# Patient Record
Sex: FEMALE | Race: WHITE | Employment: FULL TIME | URBAN - METROPOLITAN AREA
[De-identification: names, ages, dates, MRNs, and addresses within clinical notes are randomized per-mention and may not be internally consistent; named-entity substitution may affect disease eponyms.]

---

## 2017-03-31 ENCOUNTER — HOSPITAL ENCOUNTER (EMERGENCY)
Age: 27
Discharge: HOME | End: 2017-03-31
Attending: EMERGENCY MEDICINE
Payer: OTHER MISCELLANEOUS

## 2017-03-31 VITALS
WEIGHT: 149.69 LBS | DIASTOLIC BLOOD PRESSURE: 89 MMHG | RESPIRATION RATE: 18 BRPM | HEIGHT: 72 IN | HEART RATE: 95 BPM | OXYGEN SATURATION: 98 % | SYSTOLIC BLOOD PRESSURE: 120 MMHG | BODY MASS INDEX: 20.28 KG/M2 | TEMPERATURE: 97.7 F
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
## 2017-03-31 PROCEDURE — 99282 EMERGENCY DEPT VISIT SF MDM: CPT

## 2017-03-31 RX ORDER — ELETRIPTAN HYDROBROMIDE 40 MG/1
40 TABLET, FILM COATED ORAL
COMMUNITY

## 2017-03-31 RX ORDER — DOXYCYCLINE 100 MG/1
100 CAPSULE ORAL 2 TIMES DAILY
COMMUNITY

## 2017-03-31 NOTE — ED PROVIDER NOTES
HPI Comments: Adal Saunders is a 23 y.o. female with no pertinent PMHx, presenting ambulatory to the ED c/o 4/10 pain to bilateral hands/forearms, from wounds caused by cat bite, x earlier today. Pt states that she was working at a [employer] when she was bitten/scratched by a cat who unlikely is UTD on the rabies vaccination. Pt notes that the cat is being monitored and Animal control has been contacted. Pt states that she was seen at [facility] directly after the incident, where her wounds were cleaned/covered and she was prescribed Doxycycline. Pt states that [facility] advised her to come to the ED for a rabies vaccination. Pt denies any chance of pregnancy. Pt specifically denies any N/V/D or recent fever/chills. PCP: None  Social Hx: - tobacco use, - alcohol use, - illicit drug use    There are no other complaints, changes, or physical findings at this time. The history is provided by the patient. No  was used. History reviewed. No pertinent past medical history. Past Surgical History:   Procedure Laterality Date    HX TONSILLECTOMY           History reviewed. No pertinent family history. Social History     Social History    Marital status: UNKNOWN     Spouse name: N/A    Number of children: N/A    Years of education: N/A     Occupational History    Not on file. Social History Main Topics    Smoking status: Never Smoker    Smokeless tobacco: Not on file    Alcohol use Not on file    Drug use: Not on file    Sexual activity: Not on file     Other Topics Concern    Not on file     Social History Narrative    No narrative on file         ALLERGIES: Pcn [penicillins]    Review of Systems   Constitutional: Negative. Negative for chills and fever. HENT: Negative. Eyes: Negative. Respiratory: Negative. Negative for cough, shortness of breath and wheezing. Cardiovascular: Negative. Negative for chest pain. Gastrointestinal: Negative. Negative for abdominal pain, diarrhea, nausea and vomiting. Genitourinary: Negative. Negative for difficulty urinating, dysuria and vaginal pain. Skin: Positive for wound (bilateral hands/forearms with bites/scratches). Neurological: Negative. Psychiatric/Behavioral: Negative. All other systems reviewed and are negative. Vitals:    03/31/17 1716   BP: 120/89   Pulse: 95   Resp: 18   Temp: 97.7 °F (36.5 °C)   SpO2: 98%   Weight: 67.9 kg (149 lb 11.1 oz)   Height: 6' (1.829 m)            Physical Exam   Constitutional: She is oriented to person, place, and time. She appears well-developed and well-nourished. No distress. HENT:   Head: Normocephalic and atraumatic. Right Ear: External ear normal.   Left Ear: External ear normal.   Nose: Nose normal.   Mouth/Throat: Oropharynx is clear and moist. No oropharyngeal exudate. Eyes: Conjunctivae and EOM are normal. Pupils are equal, round, and reactive to light. Right eye exhibits no discharge. Left eye exhibits no discharge. No scleral icterus. Neck: Normal range of motion. Neck supple. No tracheal deviation present. Cardiovascular: Normal rate, regular rhythm, normal heart sounds and intact distal pulses. Exam reveals no gallop and no friction rub. No murmur heard. Pulmonary/Chest: Effort normal and breath sounds normal. No respiratory distress. She has no wheezes. She has no rales. She exhibits no tenderness. Abdominal: Soft. Bowel sounds are normal. She exhibits no distension and no mass. There is no tenderness. There is no rebound and no guarding. Musculoskeletal: She exhibits no edema. Lymphadenopathy:     She has no cervical adenopathy. Neurological: She is alert and oriented to person, place, and time. No cranial nerve deficit. Skin: Skin is warm and dry. Dressings to bilateral hands  Multiple linear scratches to the right forearm   Psychiatric: She has a normal mood and affect.  Her behavior is normal.   Nursing note and vitals reviewed. MDM  Number of Diagnoses or Management Options  Diagnosis management comments: DDx: cat bite, need for rabies vaccine       Amount and/or Complexity of Data Reviewed  Review and summarize past medical records: yes    Patient Progress  Patient progress: stable    ED Course       Procedures    Progress Note:  5:30 PM  Discussed with the pt that rabies vaccinations are not warranted if the pt is contained and can be monitored for 10 days. Pt verbalizes her understanding and will contact Animal control again. Written by [address], ED Scribe, as dictated by [employer]. Progress Note:  5:36 PM  Animal control states that the cat will be quarantined and the pt will have no need for the rabies vaccination at this time. Written by [address], ED Scribe, as dictated by [employer]. IMPRESSION:  1. Cat bite, subsequent encounter        PLAN:  1. Current Discharge Medication List        2. Follow-up Information     Follow up With Details Comments Contact Info    [facility] EMERGENCY DEPT  If symptoms worsen or if cat shows signs of rabies [address] [facility] [address] Sophia Garcia [facility] [address]  [phone]          Return to ED if worse   DISCHARGE NOTE:  5:49 PM  The patient is ready for discharge. The patient's signs, symptoms, diagnosis, and discharge instructions have been discussed and the patient and/or family has conveyed their understanding. The patient and/or family is to follow up as recommended or return to the ER should their symptoms worsen. Plan has been discussed and the patient and/or family is in agreement. Written by [address], ED Scribe, as dictated by [employer]. Attestation: This note is prepared by Joseph Montesinos. [address] [place] [address], acting as Scribe for [employer]. LOUIS Alvarenga:  The scribe's documentation has been prepared under my direction and personally reviewed by me in its entirety. I confirm that the note above accurately reflects all work, treatment, procedures, and medical decision making performed by me.

## 2017-03-31 NOTE — DISCHARGE INSTRUCTIONS
Animal Bites: Care Instructions  Your Care Instructions  After an animal bite, the biggest concern is infection. The chance of infection depends on the type of animal that bit you, where on your body you were bitten, and your general health. Many animal bites are not closed with stitches, because this can increase the chance of infection. Your bite may take as little as 7 days or as long as several months to heal, depending on how bad it is. Taking good care of your wound at home will help it heal and reduce your chance of infection. The doctor has checked you carefully, but problems can develop later. If you notice any problems or new symptoms, get medical treatment right away. Follow-up care is a key part of your treatment and safety. Be sure to make and go to all appointments, and call your doctor if you are having problems. It's also a good idea to know your test results and keep a list of the medicines you take. How can you care for yourself at home? · If your doctor told you how to care for your wound, follow your doctor's instructions. If you did not get instructions, follow this general advice:  ¨ After 24 to 48 hours, gently wash the wound with clean water 2 times a day. Do not scrub or soak the wound. Don't use hydrogen peroxide or alcohol, which can slow healing. ¨ You may cover the wound with a thin layer of petroleum jelly, such as Vaseline, and a nonstick bandage. ¨ Apply more petroleum jelly and replace the bandage as needed. · After you shower, gently dry the wound with a clean towel. · If your doctor has closed the wound, cover the bandage with a plastic bag before you take a shower. · A small amount of skin redness and swelling around the wound edges and the stitches or staples is normal. Your wound may itch or feel irritated. Do not scratch or rub the wound.   · Ask your doctor if you can take an over-the-counter pain medicine, such as acetaminophen (Tylenol), ibuprofen (Advil, Motrin), or naproxen (Aleve). Read and follow all instructions on the label. · Do not take two or more pain medicines at the same time unless the doctor told you to. Many pain medicines have acetaminophen, which is Tylenol. Too much acetaminophen (Tylenol) can be harmful. · If your bite puts you at risk for rabies, you will get a series of shots over the next few weeks to prevent rabies. Your doctor will tell you when to get the shots. It is very important that you get the full cycle of shots. Follow your doctor's instructions exactly. · You may need a tetanus shot if you have not received one in the last 5 years. · If your doctor prescribed antibiotics, take them as directed. Do not stop taking them just because you feel better. You need to take the full course of antibiotics. When should you call for help? Call your doctor now or seek immediate medical care if:  · The skin near the bite turns cold or pale or it changes color. · You lose feeling in the area near the bite, or it feels numb or tingly. · You have trouble moving a limb near the bite. · You have symptoms of infection, such as:  ¨ Increased pain, swelling, warmth, or redness near the wound. ¨ Red streaks leading from the wound. ¨ Pus draining from the wound. ¨ A fever. · Blood soaks through the bandage. Oozing small amounts of blood is normal.  · Your pain is getting worse. Watch closely for changes in your health, and be sure to contact your doctor if you are not getting better as expected. Where can you learn more? Go to http://rebeka-francis.info/. Enter Z431 in the search box to learn more about \"Animal Bites: Care Instructions. \"  Current as of: May 27, 2016  Content Version: 11.2  © 6788-4220 [employer]. Care instructions adapted under license by [employer] (which disclaims liability or warranty for this information).  If you have questions about a medical condition or this instruction, always ask your healthcare professional. [place] [address] any warranty or liability for your use of this information.

## 2017-03-31 NOTE — ED NOTES
Pt arrives to ED with complaints of needing rabies vac. 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 Patient states she received a call from officer abilio from animal control stating that she did not need the vaccination because the cat was being monitored. Patients bilateral hands are bandaged, bandages are CDI. No other complaints at this time.

## 2017-08-02 ENCOUNTER — HOSPITAL ENCOUNTER (OUTPATIENT)
Dept: PHYSICAL THERAPY | Age: 27
Discharge: HOME | End: 2017-08-02
Payer: COMMERCIAL

## 2017-08-02 PROCEDURE — 97161 PT EVAL LOW COMPLEX 20 MIN: CPT | Performed by: PHYSICAL THERAPIST

## 2017-08-02 PROCEDURE — 97112 NEUROMUSCULAR REEDUCATION: CPT | Performed by: PHYSICAL THERAPIST

## 2017-08-02 NOTE — PROGRESS NOTES
[address] [facility] [address]  Phone: [phone]  Fax: [phone]    Plan of Care/ Statement of Necessity for Physical Therapy Services 2-15    Patient name: Sheila Zuniga  : 1990  Provider#: [id number]  Referral source: Pepe Estes MD      Medical/Treatment Diagnosis: Right hip pain [M25.551]     Prior Hospitalization: see medical history     Comorbidities: see patient history in chart  Prior Level of Function: work 40 hours per week, exercise 20-22 hours per week, care of home, N ADL skills  Medications: Verified on Patient Summary List    Start of Care: 2017     Onset Date: about 4 months ago       The Plan of Care and following information is based on the information from the initial evaluation. Assessment/ key information: Pt presents with signs and symptoms consistent with patho PEC patterning per [facility] treatment approach. Pt will benefit from skilled PT prior to D/C to address the below and allow return to premorbid status.     Evaluation Complexity History MEDIUM  Complexity : 1-2 comorbidities / personal factors will impact the outcome/ POC ; Examination MEDIUM Complexity : 3 Standardized tests and measures addressing body structure, function, activity limitation and / or participation in recreation  ;Presentation LOW Complexity : Stable, uncomplicated  ;Clinical Decision Making MEDIUM Complexity : FOTO score of 26-74  Overall Complexity Rating: LOW     Problem List: pain affecting function, decrease ROM, decrease strength, impaired gait/ balance, decrease ADL/ functional abilitiies, decrease activity tolerance, decrease flexibility/ joint mobility and decrease transfer abilities   Treatment Plan may include any combination of the following: Therapeutic exercise, Therapeutic activities, Neuromuscular re-education, Physical agent/modality, Gait/balance training, Patient education, Self Care training, Functional mobility training, Home safety training and Stair training  Patient / Family readiness to learn indicated by: asking questions, trying to perform skills and interest  Persons(s) to be included in education: patient (P)  Barriers to Learning/Limitations: None  Patient Goal (s): to have less pain and to feel better  Patient Self Reported Health Status: good  Rehabilitation Potential: good    Short Term Goals: To be accomplished in 3 treatments:  1) Patient will demonstrate Negative Hruska Adduction Drop Test   2) Patient will demonstrate independence with HEP  3) Patient will demonstrate greater than Grade II on Hruska Adduction Lift Test  4) Patient will tolerate dry needling application with no adverse side effects    Long Term Goals: To be accomplished in 12-14 treatments:  1)Patient will demonstrate Grade IV or Grade V Hruska Adduction Lift Score to allow for functional mobility in home and community settings  2)Pt will demonstrate the ability to ambulate forward and backward achieving bilateral Acetabular Femoral Internal Rotation for pain free mobility  3)Pt will demonstrate the ability to stand, walk, jog and bike ride without subjective complaints or gait deviation  4)Pt will demonstrate independence with discharge HEP to allow for ambulation, sitting and standing without objective dysfunction    Frequency / Duration: Patient to be seen 1-2 times per week for up to 12-14 treatments. Patient/ Caregiver education and instruction: self care, activity modification and exercises    [x]  Plan of care has been reviewed with JANET Nunez PT, DPT, [facility]   8/2/2017   12:50 PM    ________________________________________________________________________    I certify that the above Therapy Services are being furnished while the patient is under my care. I agree with the treatment plan and certify that this therapy is necessary.     [id number] [phone] Signature:____________________  Date:____________Time: _________

## 2017-08-02 NOTE — PROGRESS NOTES
PT INITIAL EVALUATION NOTE 2-15    Patient Name: Mason Olvera  Date:2017  : 1990  [x]  Patient  Verified  Payor: BLUE CROSS / Plan: Ashok Vergara [id number] PPO / Product Type: PPO /    In time:12:55 PM  Out time:2:00 PM  Total Treatment Time (min): 65  Visit #: 1     Treatment Area: Right hip pain [M25.551]    SUBJECTIVE  Pain Level (0-10 scale): 1/10  Any medication changes, allergies to medications, adverse drug reactions, diagnosis change, or new procedure performed?: [] No    [x] Yes (see summary sheet for update)  Subjective:     Pt reports that back in early 3/2017 she began having a lot of right hip pain. She got a new bike with a bad fit and there was increased pain present. She was doing yoga, PT with another location and stopped her previous job all at the same time. She is standing, sitting, and walking a lot with her current job.   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; training for [employer] currently - will do some of her rides on a ; is doing about 20-22 hours of training per week  Mechanism of Injury: insidious secondary to overusage  Previous Treatment/Compliance: SD has been effective and so has dry needling  PMHx/Surgical Hx: pt has a lot of food allergies  Work Hx: 40 hours per week  Living Situation: with roommate in home that has stairs   Pt Goals: to not have pain  Barriers: pt is hypermobile  Motivation: excellent  Substance use: none stated   FABQ Score: 19(5)  Cognition: A & O x 4        OBJECTIVE/EXAMINATION    See SD evaluation scanned into chart     20 min Neuromuscular Re-education:  []  See flow sheet :   Rationale: improve coordination, improve balance and increase proprioception  to improve the patients ability to return to N ADL skills and pain-free exercise            With   [] TE   [] TA   [x] neuro   [] other: Patient Education: [x] Review HEP    [] Progressed/Changed HEP based on:   [] positioning   [] body mechanics   [] transfers   [] heat/ice application    [] other:        Other Objective/Functional Measures: see FOTO scanned into chart    Pain Level (0-10 scale) post treatment: 0/10      ASSESSMENT:      [x]  See Plan of Care      Mary Lockhart PT, DPT, [facility]  8/2/2017  12:50 PM

## 2017-08-07 ENCOUNTER — HOSPITAL ENCOUNTER (OUTPATIENT)
Dept: PHYSICAL THERAPY | Age: 27
Discharge: HOME | End: 2017-08-07
Payer: COMMERCIAL

## 2017-08-07 PROCEDURE — 97016 VASOPNEUMATIC DEVICE THERAPY: CPT | Performed by: PHYSICAL THERAPIST

## 2017-08-07 PROCEDURE — 97112 NEUROMUSCULAR REEDUCATION: CPT | Performed by: PHYSICAL THERAPIST

## 2017-08-07 NOTE — PROGRESS NOTES
PT DAILY TREATMENT NOTE 2-15    Patient Name: Aleisha Ordaz  Date:2017  : 1990  [x]  Patient  Verified  Payor: BLUE CROSS / Plan: Ashok Vergara [id number] PPO / Product Type: PPO /    In time:10:30 am  Out time:11:30 am  Total Treatment Time (min): 60  Visit #: 2     Treatment Area: Right hip pain [M25.551]    SUBJECTIVE  Pain Level (0-10 scale): 5/10 Right hip  Any medication changes, allergies to medications, adverse drug reactions, diagnosis change, or new procedure performed?: [x] No    [] Yes (see summary sheet for update)  Subjective functional status/changes:   [] No changes reported  Pt reports that she did a very hilly triathlon yesterday and was getting a lot of pain throughout as she was not able to negotiate the terrain well without difficulty.     OBJECTIVE    Modality rationale: decrease inflammation and decrease pain to improve the patients ability to return to N ADL skills   Min Type Additional Details    [] Estim: []Att   []Unatt        []TENS instruct                  []IFC  []Premod   []NMES                     []Other:  []w/US   []w/ice   []w/heat  Position:  Location:    []  Traction: [] Cervical       []Lumbar                       [] Prone          []Supine                       []Intermittent   []Continuous Lbs:  [] before manual  [] after manual  []w/heat    []  Ultrasound: []Continuous   [] Pulsed at:                            []1MHz   []3MHz Location:  W/cm2:    []  Paraffin         Location:  []w/heat    []  Ice     []  Heat  []  Ice massage Position:  Location:    []  Laser  []  Other: Position:  Location:   15 [x]  Vasopneumatic Device Pressure:       [] lo [x] med [] hi   Temperature: 36   [x] Skin assessment post-treatment:  [x]intact [x]redness- no adverse reaction    []redness  adverse reaction:      45 min Neuromuscular Re-education:  [x]  See flow sheet :   Rationale: increase ROM, increase strength, improve coordination, improve balance and increase proprioception  to improve the patients ability to ambulate and jog without pain present. With   [] TE   [] TA   [x] neuro   [] other: Patient Education: [x] Review HEP    [x] Progressed/Changed HEP based on:   [] positioning   [] body mechanics   [] transfers   [] heat/ice application    [] other:      Other Objective/Functional Measures: at beginning of session, pt had same HGIR, HadDT and squat measurements as first visit. Pain Level (0-10 scale) post treatment: 2/10 'feels numb'    ASSESSMENT/Changes in Function:     Patient will continue to benefit from skilled PT services to modify and progress therapeutic interventions, address functional mobility deficits, address ROM deficits, address strength deficits, analyze and address soft tissue restrictions, analyze and cue movement patterns, analyze and modify body mechanics/ergonomics, assess and modify postural abnormalities, address imbalance/dizziness and instruct in home and community integration to attain remaining goals. []  See Plan of Care  []  See progress note/recertification  []  See Discharge Summary         Progress towards goals / Updated goals:  Pt has been compliant with HEP, however since she competed yesterday, her pain level is higher and she did not attend PT in neutrality.     PLAN  []  Upgrade activities as tolerated     [x]  Continue plan of care  [x]  Update interventions per flow sheet       []  Discharge due to:_  []  Other:_      Yari Scott, PT, DPT, [facility] 8/7/2017  10:30 AM

## 2017-08-10 ENCOUNTER — HOSPITAL ENCOUNTER (OUTPATIENT)
Dept: PHYSICAL THERAPY | Age: 27
Discharge: HOME | End: 2017-08-10
Payer: COMMERCIAL

## 2017-08-10 PROCEDURE — 97014 ELECTRIC STIMULATION THERAPY: CPT | Performed by: PHYSICAL THERAPIST

## 2017-08-10 PROCEDURE — 97112 NEUROMUSCULAR REEDUCATION: CPT | Performed by: PHYSICAL THERAPIST

## 2017-08-10 PROCEDURE — 97140 MANUAL THERAPY 1/> REGIONS: CPT | Performed by: PHYSICAL THERAPIST

## 2017-08-10 NOTE — PROGRESS NOTES
PT DAILY TREATMENT NOTE 2-15    Patient Name: Darren Bishop  Date:8/10/2017  : 1990  [x]  Patient  Verified  Payor: BLUE CROSS / Plan: [facility] PPO / Product Type: PPO /    In time:10:00 am  Out time:11:00 am  Total Treatment Time (min): 60  Visit #: 3     Treatment Area: Right hip pain [M25.551]    SUBJECTIVE  Pain Level (0-10 scale): 2/10 Right hip  Any medication changes, allergies to medications, adverse drug reactions, diagnosis change, or new procedure performed?: [x] No    [] Yes (see summary sheet for update)  Subjective functional status/changes:   [] No changes reported  Patient reports an improvement in right hip pain compared to her last visit and she has been able to perform her HEP regularly.     OBJECTIVE    Modality rationale: decrease inflammation and decrease pain to improve the patients ability to return to N ADL skills   Min Type Additional Details   15 [x] Estim: []Att   [x]Unatt        []TENS instruct                  []IFC  []Premod   []NMES                     [x]Other: asymmetrical, biphasic, 300 usec, 2 Hz []w/US   []w/ice   []w/heat  Position: left sidelying  Location: right glut med    []  Traction: [] Cervical       []Lumbar                       [] Prone          []Supine                       []Intermittent   []Continuous Lbs:  [] before manual  [] after manual  []w/heat    []  Ultrasound: []Continuous   [] Pulsed at:                            []1MHz   []3MHz Location:  W/cm2:    []  Paraffin         Location:  []w/heat    []  Ice     []  Heat  []  Ice massage Position:  Location:    []  Laser  []  Other: Position:  Location:    []  Vasopneumatic Device Pressure:       [] lo [] med [] hi   Temperature:    [x] Skin assessment post-treatment:  [x]intact [x]redness- no adverse reaction    []redness  adverse reaction:      15 min Neuromuscular Re-education:  [x]  See flow sheet :   Rationale: increase ROM, increase strength, improve coordination, improve balance and increase proprioception  to improve the patients ability to ambulate and jog without pain present. 30 min Manual Therapy:  DN was performed in left sidelying to the muscle belly of the right glut med and min at 1 finger breath medial to ASIS, 3 finger breaths medial to ASIS, and 5 finger breaths medial. DN was then performed to the superior glut max at 1 finger breath lateral to PSIS and 3 finger breaths lateral to PSIS. 5 needles total were used, all 60 mm x .30 mm and taken to bony contact on ilium. Chidi Mica was performed to elicit LTR's and needles were left in situ for 15 minutes to elicit optimal pain relief. Rationale: increase ROM, decrease pain  to improve the patients ability to ambulate and jog without pain present. With   [] TE   [] TA   [x] neuro   [] other: Patient Education: [x] Review HEP    [x] Progressed/Changed HEP based on:   [] positioning   [] body mechanics   [] transfers   [] heat/ice application    [x] other: Patient was educated on the relative risks and adverse effects from DN. She was also educated on potential contraindications and both verbal and written consent was obtained. Other Objective/Functional Measures:   Pre-DN:   R Add Drop: Pos    Post-DN:   R Add Drop: Neg    Pain Level (0-10 scale) post treatment: 0    ASSESSMENT/Changes in Function:     Patient will continue to benefit from skilled PT services to modify and progress therapeutic interventions, address functional mobility deficits, address ROM deficits, address strength deficits, analyze and address soft tissue restrictions, analyze and cue movement patterns, analyze and modify body mechanics/ergonomics, assess and modify postural abnormalities, address imbalance/dizziness and instruct in home and community integration to attain remaining goals.      []  See Plan of Care  []  See progress note/recertification  []  See Discharge Summary         Progress towards goals / Updated goals:  Patient did very well with introduction of DN today with reduced pain and muscular tone along the right hip abductors. She will continue to benefit from further progression of neuromuscular re-education program while utilizing DN as needed for inhibition purposes. PLAN  []  Upgrade activities as tolerated     [x]  Continue plan of care  [x]  Update interventions per flow sheet       []  Discharge due to:_  []  Other:_      Lisandro Gorman, PT  , DPT, OCS, Cert.  DN   8/10/2017  10:30 AM

## 2017-08-16 ENCOUNTER — APPOINTMENT (OUTPATIENT)
Dept: PHYSICAL THERAPY | Age: 27
End: 2017-08-16
Payer: COMMERCIAL

## 2017-08-22 ENCOUNTER — HOSPITAL ENCOUNTER (OUTPATIENT)
Dept: PHYSICAL THERAPY | Age: 27
Discharge: HOME | End: 2017-08-22
Payer: COMMERCIAL

## 2017-08-22 PROCEDURE — 97112 NEUROMUSCULAR REEDUCATION: CPT | Performed by: PHYSICAL THERAPIST

## 2017-08-22 NOTE — PROGRESS NOTES
PT DAILY TREATMENT NOTE 2-15    Patient Name: Mason Olvera  Date:2017  : 1990  [x]  Patient  Verified  Payor: BLUE CROSS / Plan: [facility] PPO / Product Type: PPO /    In time: 8:30 am  Out time: 9:30 am  Total Treatment Time (min): 60  Visit #: 4     Treatment Area: Right hip pain [M25.551]    SUBJECTIVE  Pain Level (0-10 scale): 3/10 bilateral hip flexor tightness  Any medication changes, allergies to medications, adverse drug reactions, diagnosis change, or new procedure performed?: [x] No    [] Yes (see summary sheet for update)  Subjective functional status/changes:   [] No changes reported  Pt reports that she feels tightness overall but isn't feeling the hip pain that she had before. OBJECTIVE    60 min Neuromuscular Re-education:  [x]  See flow sheet :   Rationale: increase ROM, increase strength, improve coordination, improve balance and increase proprioception  to improve the patients ability to ambulate and jog without pain present. With   [] TE   [] TA   [x] neuro   [] other: Patient Education: [x] Review HEP    [x] Progressed/Changed HEP based on:   [] positioning   [] body mechanics   [] transfers   [] heat/ice application    [] other:      Other Objective/Functional Measures: at beginning of session, pt had negative bilateral HGIR, HadDT bilaterally negative; HaDLT bilaterally is 3/5     Pain Level (0-10 scale) post treatment: 2/10    ASSESSMENT/Changes in Function:     Patient will continue to benefit from skilled PT services to modify and progress therapeutic interventions, address functional mobility deficits, address ROM deficits, address strength deficits, analyze and address soft tissue restrictions, analyze and cue movement patterns, analyze and modify body mechanics/ergonomics, assess and modify postural abnormalities, address imbalance/dizziness and instruct in home and community integration to attain remaining goals.      []  See Plan of Care  []  See progress note/recertification  []  See Discharge Summary         Progress towards goals / Updated goals:  Pt has demonstrated excellent compliance with HEP with the ability to maintain neutrality between sessions.     PLAN  []  Upgrade activities as tolerated     [x]  Continue plan of care  [x]  Update interventions per flow sheet       []  Discharge due to:_  []  Other:_      Rhett Barillas PT, DPT, [facility] 8/22/2017  10:30 AM

## 2017-08-29 ENCOUNTER — HOSPITAL ENCOUNTER (OUTPATIENT)
Dept: PHYSICAL THERAPY | Age: 27
Discharge: HOME | End: 2017-08-29
Payer: COMMERCIAL

## 2017-08-29 PROCEDURE — 97112 NEUROMUSCULAR REEDUCATION: CPT | Performed by: PHYSICAL THERAPIST

## 2017-08-29 PROCEDURE — 97140 MANUAL THERAPY 1/> REGIONS: CPT | Performed by: PHYSICAL THERAPIST

## 2017-08-29 PROCEDURE — 97014 ELECTRIC STIMULATION THERAPY: CPT | Performed by: PHYSICAL THERAPIST
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
## 2017-09-05 ENCOUNTER — HOSPITAL ENCOUNTER (OUTPATIENT)
Dept: PHYSICAL THERAPY | Age: 27
Discharge: HOME | End: 2017-09-05
Payer: COMMERCIAL

## 2017-09-05 PROCEDURE — 97112 NEUROMUSCULAR REEDUCATION: CPT | Performed by: PHYSICAL THERAPIST

## 2017-09-12 ENCOUNTER — HOSPITAL ENCOUNTER (OUTPATIENT)
Dept: PHYSICAL THERAPY | Age: 27
Discharge: HOME | End: 2017-09-12
Payer: COMMERCIAL

## 2017-09-12 PROCEDURE — 97112 NEUROMUSCULAR REEDUCATION: CPT | Performed by: PHYSICAL THERAPIST

## 2017-09-12 NOTE — PROGRESS NOTES
[facility]  [address] [place] [place] [facility] [place], [address] [address] [address]  Phone: [phone]      Fax:  [phone]    Progress Note    Name: Carol Tello   : 1990   MD: Alvaro Monae MD       Treatment Diagnosis: Right hip pain [M25.551]  Start of Care: 2017    Visits from Start of Care: 7  Missed Visits: 0    Summary of Care: Jeison Kaba is progressing through her [facility] programming and has reached all STG at this time. She has been compliant with her HEP and has attended all treatment sessions scheduled. She will continue to benefit from skilled PT prior to D/C to address remaining functional limitations and attain all LTG goals. Neuromuscular re-education, therapeutic activities    Assessment / Recommendations:     Short Term Goals:  To be accomplished in 3 treatments:   ALL MET  1) Patient will demonstrate Negative Hruska Adduction Drop Test   2) Patient will demonstrate independence with HEP  3) Patient will demonstrate greater than Grade II on Hruska Adduction Lift Test  4) Patient will tolerate dry needling application with no adverse side effects    Goal:1)Patient will demonstrate Grade IV or Grade V Hruska Adduction Lift Score to allow for functional mobility in home and community settings  Status at last note/certification: -  Status at progress note: not met    Goal:2)Pt will demonstrate the ability to ambulate forward and backward achieving bilateral Acetabular Femoral Internal Rotation for pain free mobility  Status at last note/certification:-  Status at progress note: not met    Goal:3)Pt will demonstrate the ability to stand, walk, jog and bike ride without subjective complaints or gait deviation  Status at last note/certification:-  Status at progress note: met  intermittantly    Goal:4)Pt will demonstrate independence with discharge HEP to allow for ambulation, sitting and standing without objective dysfunction  Status at last note/certification:-  Status at progress note: met at current time    Other: Pt will benefit from 1x/wk for up to 8 more weeks. Ab Dwyer, PT, DPT, [facility]  9/12/2017  2:52 PM    ________________________________________________________________________  NOTE TO PHYSICIAN:  Please complete the following and fax to: [facility]: [phone]  . Retain this original for your records. If you are unable to process this request in 24 hours, please contact our office.        ____ I have read the above report and request that my patient continue therapy with the following changes/special instructions:  ____ I have read the above report and request that my patient be discharged from therapy    Physician's Signature:_________________ Date:___________Time:__________

## 2017-09-12 NOTE — PROGRESS NOTES
PT DAILY TREATMENT NOTE 2-15    Patient Name: Ely [place]  Date:2017  : 1990  [x]  Patient  Verified  Payor: BLUE CROSS / Plan: [id number] PPO / Product Type: PPO /    In time:7:30 am  Out time:8:30 am  Total Treatment Time (min): 60  Visit #: 7     Treatment Area: Right hip pain [M25.551]    SUBJECTIVE  Pain Level (0-10 scale): 4  Any medication changes, allergies to medications, adverse drug reactions, diagnosis change, or new procedure performed?: [x] No    [] Yes (see summary sheet for update)  Subjective functional status/changes:   [] No changes reported  Pt reports that when she does her PT home program before her runs that she is 'feeling pretty good'. OBJECTIVE      45 min Neuromuscular Re-education:  [x]  See flow sheet :   Rationale: increase strength, improve coordination, improve balance and increase proprioception  to improve the patients ability to ambulate without pain and dysfunction            With   [] TE   [] TA   [x] neuro   [] other: Patient Education: [x] Review HEP    [x] Progressed/Changed HEP based on: ability to tolerate increased intensity of intervention  [] positioning   [] body mechanics   [] transfers   [] heat/ice application    [x] other: SD programming progressed     Other Objective/Functional Measures:   HadDT bilaterally was negative   HaDLT R Level 3, HaDLT L Level 3+  Squat Level 3  + R HGIR     Pain Level (0-10 scale) post treatment: 0/10    ASSESSMENT/Changes in Function:     Patient will continue to benefit from skilled PT services to address functional mobility deficits, address strength deficits, analyze and cue movement patterns, analyze and modify body mechanics/ergonomics, assess and modify postural abnormalities, address imbalance/dizziness and instruct in home and community integration to attain remaining goals.      []  See Plan of Care  [x]  See progress note/recertification  []  See Discharge Summary         Progress towards goals / Updated goals:  See progress note       PLAN  [x]  Upgrade activities as tolerated     [x]  Continue plan of care  []  Update interventions per flow sheet       []  Discharge due to:_  []  Other:_        Isabella Richardson, PT, DPT, [facility]  9/12/2017  2:30 PM

## 2017-09-19 ENCOUNTER — APPOINTMENT (OUTPATIENT)
Dept: PHYSICAL THERAPY | Age: 27
End: 2017-09-19
Payer: COMMERCIAL

## 2017-09-26 ENCOUNTER — HOSPITAL ENCOUNTER (OUTPATIENT)
Dept: PHYSICAL THERAPY | Age: 27
Discharge: HOME | End: 2017-09-26
Payer: COMMERCIAL

## 2017-09-26 PROCEDURE — 97112 NEUROMUSCULAR REEDUCATION: CPT | Performed by: PHYSICAL THERAPIST

## 2017-09-26 NOTE — PROGRESS NOTES
PT DAILY TREATMENT NOTE 2-15    Patient Name: Miguel Ángel De Jesus  Date:2017  : 1990  [x]  Patient  Verified  Payor: BLUE CROSS / Plan: [id number] PPO / Product Type: PPO /    In time:7:30 am  Out time:8:30 am  Total Treatment Time (min): 60  Visit #: 8     Treatment Area: Right hip pain [M25.551]    SUBJECTIVE  Pain Level (0-10 scale): 0  Any medication changes, allergies to medications, adverse drug reactions, diagnosis change, or new procedure performed?: [x] No    [] Yes (see summary sheet for update)  Subjective functional status/changes:   [] No changes reported  Pt reports that she has been compliant and has felt great. OBJECTIVE      45 min Neuromuscular Re-education:  [x]  See flow sheet :   Rationale: increase strength, improve coordination, improve balance and increase proprioception  to improve the patients ability to ambulate without pain and dysfunction            With   [] TE   [] TA   [x] neuro   [] other: Patient Education: [x] Review HEP    [x] Progressed/Changed HEP based on: ability to tolerate increased intensity of intervention  [] positioning   [] body mechanics   [] transfers   [] heat/ice application    [x] other: SD programming progressed     Other Objective/Functional Measures:   HadDT bilaterally was negative   HaDLT R Level 3+, HaDLT L Level 3+  Squat Level 3+  - R HGIR     Pain Level (0-10 scale) post treatment: 0/10    ASSESSMENT/Changes in Function:     Patient will continue to benefit from skilled PT services to address functional mobility deficits, address strength deficits, analyze and cue movement patterns, analyze and modify body mechanics/ergonomics, assess and modify postural abnormalities, address imbalance/dizziness and instruct in home and community integration to attain remaining goals.      []  See Plan of Care  [x]  See progress note from last visit/recertification  []  See Discharge Summary         Progress towards goals / Updated goals:  See progress note       PLAN  [x]  Upgrade activities as tolerated     [x]  Continue plan of care  []  Update interventions per flow sheet       []  Discharge due to:_  []  Other:_        Brissa Parra, PT, DPT, [facility]  9/26/2017  2:30 PM

## 2017-10-04 ENCOUNTER — HOSPITAL ENCOUNTER (OUTPATIENT)
Dept: PHYSICAL THERAPY | Age: 27
Discharge: HOME | End: 2017-10-04
Payer: COMMERCIAL

## 2017-10-04 PROCEDURE — 97112 NEUROMUSCULAR REEDUCATION: CPT | Performed by: PHYSICAL THERAPIST

## 2017-10-04 NOTE — PROGRESS NOTES
PT DAILY TREATMENT NOTE 2-15    Patient Name: Kei Console  Date:10/4/2017  : 1990  [x]  Patient  Verified  Payor: BLUE CROSS / Plan: [employer] [id number] PPO / Product Type: PPO /    In time:7:30 am  Out time:8:30 am  Total Treatment Time (min): 60  Visit #: 9     Treatment Area: Pain in right hip [M25.551]    SUBJECTIVE  Pain Level (0-10 scale): 0  Any medication changes, allergies to medications, adverse drug reactions, diagnosis change, or new procedure performed?: [x] No    [] Yes (see summary sheet for update)  Subjective functional status/changes:   [] No changes reported  Pt reports that overall she is doing well, however she is still having pain with prolonged exercise. OBJECTIVE      45 min Neuromuscular Re-education:  [x]  See flow sheet :   Rationale: increase strength, improve coordination, improve balance and increase proprioception  to improve the patients ability to ambulate without pain and dysfunction            With   [] TE   [] TA   [x] neuro   [] other: Patient Education: [x] Review HEP    [x] Progressed/Changed HEP based on: ability to tolerate increased intensity of intervention  [] positioning   [] body mechanics   [] transfers   [] heat/ice application    [x] other: SD programming progressed     Other Objective/Functional Measures:   HadDT bilaterally -    HaDLT R Level 3+, HaDLT L Level 3+  Squat Level 3+  - R HGIR     Pain Level (0-10 scale) post treatment: 0/10      ASSESSMENT/Changes in Function:     Patient will continue to benefit from skilled PT services to address functional mobility deficits, address strength deficits, analyze and cue movement patterns, analyze and modify body mechanics/ergonomics, assess and modify postural abnormalities, address imbalance/dizziness and instruct in home and community integration to attain remaining goals.      []  See Plan of Care  [x]  See progress note/recertification  []  See Discharge Summary         Progress towards goals / Updated goals:  See progress note       PLAN  [x]  Upgrade activities as tolerated     [x]  Continue plan of care  []  Update interventions per flow sheet       []  Discharge due to:_  []  Other:_        Carson Ayala PT, DPT, [facility]  10/4/2017  2:30 PM

## 2017-10-09 ENCOUNTER — APPOINTMENT (OUTPATIENT)
Dept: PHYSICAL THERAPY | Age: 27
End: 2017-10-09
Payer: COMMERCIAL

## 2017-10-11 ENCOUNTER — HOSPITAL ENCOUNTER (OUTPATIENT)
Dept: PHYSICAL THERAPY | Age: 27
Discharge: HOME | End: 2017-10-11
Payer: COMMERCIAL

## 2017-10-11 ENCOUNTER — APPOINTMENT (OUTPATIENT)
Dept: PHYSICAL THERAPY | Age: 27
End: 2017-10-11

## 2017-10-11 PROCEDURE — 97112 NEUROMUSCULAR REEDUCATION: CPT | Performed by: PHYSICAL THERAPIST

## 2017-10-11 NOTE — PROGRESS NOTES
PT DAILY TREATMENT NOTE 2-15    Patient Name: Nav Tarango  Date:10/11/2017  : 1990  [x]  Patient  Verified  Payor: BLUE CROSS / Plan: [id number] PPO / Product Type: PPO /    In time:1:00 pm  Out time:2:00 pm  Total Treatment Time (min): 45  Visit #: 9     Treatment Area: Right hip pain [M25.551]    SUBJECTIVE  Pain Level (0-10 scale): 0  Any medication changes, allergies to medications, adverse drug reactions, diagnosis change, or new procedure performed?: [x] No    [] Yes (see summary sheet for update)  Subjective functional status/changes:   [x] No changes reported      OBJECTIVE      45 min Neuromuscular Re-education:  [x]  See flow sheet :   Rationale: increase strength, improve coordination, improve balance and increase proprioception  to improve the patients ability to ambulate without pain and dysfunction            With   [] TE   [] TA   [x] neuro   [] other: Patient Education: [x] Review HEP    [x] Progressed/Changed HEP based on: ability to tolerate increased intensity of intervention  [] positioning   [] body mechanics   [] transfers   [] heat/ice application    [x] other: SD programming progressed     Other Objective/Functional Measures:   HadDT bilaterally was negative   HaDLT R Level 3+, HaDLT L Level 3+  - R HGIR     Pain Level (0-10 scale) post treatment: 0/10    ASSESSMENT/Changes in Function:     Patient will continue to benefit from skilled PT services to address functional mobility deficits, address strength deficits, analyze and cue movement patterns, analyze and modify body mechanics/ergonomics, assess and modify postural abnormalities, address imbalance/dizziness and instruct in home and community integration to attain remaining goals. []  See Plan of Care  [x]  See progress note from last visit  []  See Discharge Summary         Progress towards goals / Updated goals:  See progress note from last visit.   Pt continues to progress toward LTG at this time.      PLAN  [x]  Upgrade activities as tolerated     [x]  Continue plan of care  []  Update interventions per flow sheet       []  Discharge due to:_  []  Other:_        Dylan Akins, PT, DPT, [facility]  10/11/2017  2:30 PM

## 2017-10-12 ENCOUNTER — APPOINTMENT (OUTPATIENT)
Dept: PHYSICAL THERAPY | Age: 27
End: 2017-10-12
Payer: COMMERCIAL

## 2017-10-16 ENCOUNTER — APPOINTMENT (OUTPATIENT)
Dept: PHYSICAL THERAPY | Age: 27
End: 2017-10-16
Payer: COMMERCIAL

## 2017-10-23 ENCOUNTER — APPOINTMENT (OUTPATIENT)
Dept: PHYSICAL THERAPY | Age: 27
End: 2017-10-23
Payer: COMMERCIAL

## 2017-10-25 ENCOUNTER — HOSPITAL ENCOUNTER (OUTPATIENT)
Dept: PHYSICAL THERAPY | Age: 27
Discharge: HOME | End: 2017-10-25
Payer: COMMERCIAL

## 2017-10-25 PROCEDURE — 97112 NEUROMUSCULAR REEDUCATION: CPT | Performed by: PHYSICAL THERAPIST

## 2017-10-25 PROCEDURE — 97140 MANUAL THERAPY 1/> REGIONS: CPT | Performed by: PHYSICAL THERAPIST

## 2017-10-25 PROCEDURE — 97014 ELECTRIC STIMULATION THERAPY: CPT | Performed by: PHYSICAL THERAPIST

## 2017-10-25 NOTE — PROGRESS NOTES
PT DAILY TREATMENT NOTE 2-15    Patient Name: Marcos Estrada  Date:10/25/2017  : 1990  [x]  Patient  Verified  Payor: BLUE CROSS / Plan: Ashok Vergara [id number] PPO / Product Type: PPO /    In time:1:30 PM  Out time:2:25 PM  Total Treatment Time (min): 55  Visit #: 10     Treatment Area: Right hip pain [M25.551]    SUBJECTIVE  Pain Level (0-10 scale): 1  Any medication changes, allergies to medications, adverse drug reactions, diagnosis change, or new procedure performed?: [x] No    [] Yes (see summary sheet for update)  Subjective functional status/changes:   [] No changes reported   Patient reports mild soreness along the left hip and B [place]'s. She is on her taper phase for her Ironman next week.     OBJECTIVE             Modality rationale: decrease inflammation and decrease pain to improve the patients ability to return to N ADL skills   Min Type Additional Details   15 [x] Estim: []Att   [x]Unatt        []TENS instruct                  []IFC  []Premod   []NMES                     [x]Other: asymmetrical, biphasic, 300 usec, 2 Hz []w/US   []w/ice   []w/heat  Position: left sidelying  Location: right glut med     []  Traction: [] Cervical       []Lumbar                       [] Prone          []Supine                       []Intermittent   []Continuous Lbs:  [] before manual  [] after manual  []w/heat     []  Ultrasound: []Continuous   [] Pulsed at:                            []1MHz   []3MHz Location:  W/cm2:     []  Paraffin     Location:  []w/heat     []  Ice     []  Heat  []  Ice massage Position:  Location:     []  Laser  []  Other: Position:  Location:     []  Vasopneumatic Device Pressure:       [] lo [] med [] hi   Temperature:    [x] Skin assessment post-treatment:  [x]intact [x]redness- no adverse reaction    []redness  adverse reaction:       15 min Neuromuscular Re-education:  [x]  See flow sheet :   Rationale: increase ROM, increase strength, improve coordination, improve balance and increase proprioception  to improve the patients ability to ambulate and jog without pain present.         25 min Manual Therapy:  DN was performed in left sidelying to the muscle belly of the right glut med and min at 1 finger breath medial to ASIS, 3 finger breaths medial to ASIS, and 5 finger breaths medial. DN was then performed to the superior glut max at 1 finger breath lateral to PSIS and 3 finger breaths lateral to PSIS. 5 needles total were used, all 60 mm x .30 mm and taken to bony contact on ilium. [place] was performed to elicit LTR's and needles were left in situ for 15 minutes to elicit optimal pain relief. DN was then performed to the right and left gastrocnemius medial muscle bellies, 4 needles each with multiple LTR's elicited with fanning technique. 4 x  40 mm x .30 mm needles were inserted into the medial and lateral aspects of the superior and inferior [place]'s tendon. Winding of the needles was performed to cause an increase in tissue extensibility and further reduction in pain. Rationale: increase ROM, decrease pain  to improve the patients ability to ambulate and jog without pain present.      With   [] TE   [] TA   [x] neuro   [] other: Patient Education: [x] Review HEP    [x] Progressed/Changed HEP based on:   [] positioning   [] body mechanics   [] transfers   [] heat/ice application    [x] other: Patient was educated on the relative risks and adverse effects from DN.  She was also educated on potential contraindications and both verbal and written consent was obtained.        Other Objective/Functional Measures:      Pain Level (0-10 scale) post treatment: 0/10    ASSESSMENT/Changes in Function:     Patient will continue to benefit from skilled PT services to address functional mobility deficits, address strength deficits, analyze and cue movement patterns, analyze and modify body mechanics/ergonomics, assess and modify postural abnormalities, address imbalance/dizziness and instruct in home and community integration to attain remaining goals. []  See Plan of Care  [x]  See progress note from last visit  []  See Discharge Summary         Progress towards goals / Updated goals:  Patient tolerated today's DN to both the L hip and B calf/Earl's very well with a significant gain in hip and ankle ROM and a complete resolution in       PLAN  [x]  Upgrade activities as tolerated     [x]  Continue plan of care  []  Update interventions per flow sheet       []  Discharge due to:_  []  Other:_        Ivon Oseguera, PT,  DPT, OCS, Cert.  DN   10/25/2017  2:30 PM

## 2017-10-30 ENCOUNTER — APPOINTMENT (OUTPATIENT)
Dept: PHYSICAL THERAPY | Age: 27
End: 2017-10-30
Payer: COMMERCIAL

## 2017-10-30 ENCOUNTER — HOSPITAL ENCOUNTER (OUTPATIENT)
Dept: PHYSICAL THERAPY | Age: 27
Discharge: HOME | End: 2017-10-30
Payer: COMMERCIAL

## 2017-10-30 PROCEDURE — 97112 NEUROMUSCULAR REEDUCATION: CPT | Performed by: PHYSICAL THERAPIST

## 2017-10-30 NOTE — PROGRESS NOTES
PT DAILY TREATMENT NOTE 2-15    Patient Name: Rafael Steward  Date:10/30/2017  : 1990  [x]  Patient  Verified  Payor: BLUE CROSS / Plan: Ashok Vergara [id number] PPO / Product Type: PPO /    In time:3:00 pm  Out time:4:00 pm  Total Treatment Time (min): 60  Visit #: 11     Treatment Area: Right hip pain [M25.551]    SUBJECTIVE  Pain Level (0-10 scale): 0  Any medication changes, allergies to medications, adverse drug reactions, diagnosis change, or new procedure performed?: [x] No    [] Yes (see summary sheet for update)  Subjective functional status/changes:   [x] No changes reported      OBJECTIVE      45 min Neuromuscular Re-education:  [x]  See flow sheet :   Rationale: increase strength, improve coordination, improve balance and increase proprioception  to improve the patients ability to ambulate without pain and dysfunction            With   [] TE   [] TA   [x] neuro   [] other: Patient Education: [x] Review HEP    [x] Progressed/Changed HEP based on: ability to tolerate increased intensity of intervention  [] positioning   [] body mechanics   [] transfers   [] heat/ice application    [x] other: SD programming progressed     Other Objective/Functional Measures:   HadDT bilaterally was negative   HaDLT R Level 3+, HaDLT L Level 3+  - R HGIR     Pain Level (0-10 scale) post treatment: 0/10    ASSESSMENT/Changes in Function:     Patient will continue to benefit from skilled PT services to address functional mobility deficits, address strength deficits, analyze and cue movement patterns, analyze and modify body mechanics/ergonomics, assess and modify postural abnormalities, address imbalance/dizziness and instruct in home and community integration to attain remaining goals. []  See Plan of Care  []  See progress note from last visit  []  See Discharge Summary         Progress towards goals / Updated goals:  See progress note from last visit.   Pt continues to progress toward LTG at this time.      PLAN  [x]  Upgrade activities as tolerated     [x]  Continue plan of care  []  Update interventions per flow sheet       []  Discharge due to:_  [x]  Other: 1 more visit following kimani Akins PT, DPT, [facility]  10/30/2017  2:30 PM

## 2017-11-21 ENCOUNTER — APPOINTMENT (OUTPATIENT)
Dept: PHYSICAL THERAPY | Age: 27
End: 2017-11-21
Payer: COMMERCIAL

## 2017-11-27 ENCOUNTER — HOSPITAL ENCOUNTER (OUTPATIENT)
Dept: PHYSICAL THERAPY | Age: 27
Discharge: HOME | End: 2017-11-27
Payer: COMMERCIAL

## 2017-11-27 PROCEDURE — 97112 NEUROMUSCULAR REEDUCATION: CPT | Performed by: PHYSICAL THERAPIST

## 2017-11-27 NOTE — PROGRESS NOTES
PT DAILY TREATMENT NOTE 2-15    Patient Name: Hussain Conti  Date:2017  : 1990  [x]  Patient  Verified  Payor: BLUE CROSS / Plan: [facility] PPO / Product Type: PPO /    In time: 11:30 am  Out time:12:15 pm  Total Treatment Time (min): 45  Visit #: 13     Treatment Area: Right hip pain [M25.551]    SUBJECTIVE  Pain Level (0-10 scale): 0  Any medication changes, allergies to medications, adverse drug reactions, diagnosis change, or new procedure performed?: [x] No    [] Yes (see summary sheet for update)  Subjective functional status/changes:   [] No changes reported  Pt reports that her feet have been hurting since she did her Ironman. OBJECTIVE    45 min Neuromuscular Re-education:  [x]  See flow sheet :   Rationale: increase strength, improve coordination, improve balance and increase proprioception  to improve the patients ability to ambulate without pain and dysfunction            With   [] TE   [] TA   [x] neuro   [] other: Patient Education: [x] Review HEP    [x] Progressed/Changed HEP based on: ability to tolerate increased intensity of intervention  [] positioning   [] body mechanics   [] transfers   [] heat/ice application    [x] other: SD programming progressed     Other Objective/Functional Measures:   HadDT bilaterally was negative   HaDLT R Level 4, HaDLT L Level 4  + R HGIR     Pain Level (0-10 scale) post treatment: 0/10    ASSESSMENT/Changes in Function:      []  See Plan of Care  []  See progress note from last visit  [x]  See Discharge Summary         Progress towards goals / Updated goals: All LTG met.       PLAN  []  Upgrade activities as tolerated     []  Continue plan of care  []  Update interventions per flow sheet       [x]  Discharge due to: completion of POC  []  Other:        Juli Rios, PT, DPT, [facility]  2017  2:30 PM

## 2017-11-27 NOTE — ANCILLARY DISCHARGE INSTRUCTIONS
[address] [facility] [place], [facility] Dara Sommers 58  Phone: [phone]  Fax: [phone]    Discharge Summary  2-15    Patient name: Sahara Reese  : 1990  Provider#: [id number]  Referral source: Annamarie Dowd MD      Medical/Treatment Diagnosis: Right hip pain [M25.551]     Prior Hospitalization: see medical history     Comorbidities: See Plan of Care  Prior Level of Function:See Plan of Care  Medications: Verified on Patient Summary List    Start of Care: 2017      Onset Date:2017   Visits from Start of Care: 13     Missed Visits: 1  Reporting Period : 2017 to 2017      ASSESSMENT/SUMMARY OF CARE: Pt demonstrated excellent compliance with both clinical treatment and home exercises and has achieved all LTG as listed below. She has been discharged with a home program to continue indefinitely.     Goal:1)Patient will demonstrate Grade IV or Grade V Hruska Adduction Lift Score to allow for functional mobility in home and community settings  Status at last note/certification: not met  Status at discharge: met    Goal:2)Pt will demonstrate the ability to ambulate forward and backward achieving bilateral Acetabular Femoral Internal Rotation for pain free mobility  Status at last note/certification: not met  Status at discharge: met    Goal:3)Pt will demonstrate the ability to jog without subjective complaints or gait deviation  Status at last note/certification: not met  Status at discharge: met    Goal:4)Pt will demonstrate independence with discharge HEP to allow for ambulation, sitting and standing without objective dysfunction  Status at last note/certification: met   Status at discharge: met        RECOMMENDATIONS:  [x]Discontinue therapy: [x]Patient has reached or is progressing toward set goals      []Patient is non-compliant or has abdicated      []Due to lack of appreciable progress towards set goals      []Other    Gabriel Hough, PT, JAMIL, [facility]  11/27/2017  12:44 PM

## 2018-01-18 ENCOUNTER — APPOINTMENT (OUTPATIENT)
Dept: PHYSICAL THERAPY | Age: 28
End: 2018-01-18

## 2018-01-23 ENCOUNTER — APPOINTMENT (OUTPATIENT)
Dept: PHYSICAL THERAPY | Age: 28
End: 2018-01-23

## 2018-01-30 ENCOUNTER — HOSPITAL ENCOUNTER (OUTPATIENT)
Dept: PHYSICAL THERAPY | Age: 28
Discharge: HOME | End: 2018-01-30
Payer: COMMERCIAL

## 2018-01-30 PROCEDURE — 97140 MANUAL THERAPY 1/> REGIONS: CPT | Performed by: PHYSICAL THERAPIST

## 2018-01-30 PROCEDURE — 97110 THERAPEUTIC EXERCISES: CPT | Performed by: PHYSICAL THERAPIST

## 2018-01-30 PROCEDURE — 97014 ELECTRIC STIMULATION THERAPY: CPT | Performed by: PHYSICAL THERAPIST

## 2018-01-30 PROCEDURE — 97161 PT EVAL LOW COMPLEX 20 MIN: CPT | Performed by: PHYSICAL THERAPIST

## 2018-01-30 NOTE — PROGRESS NOTES
PT INITIAL EVALUATION NOTE 2-15    Patient Name: Mardene Fothergill  Date:2018  : 1990  [x]  Patient  Verified  Payor: BLUE CROSS / Plan: Ashok Vergara [id number] PPO / Product Type: PPO /    In time:11:00 AM  Out time:12:00 PM  Total Treatment Time (min): 60  Visit #: 1     Treatment Area: Knee pain [M25.569]    SUBJECTIVE  Pain Level (0-10 scale): 3  Any medication changes, allergies to medications, adverse drug reactions, diagnosis change, or new procedure performed?: [] No    [x] Yes (see summary sheet for update)  Subjective:     B [place]'s tendinitis  PLOF: No limitations with running  Mechanism of Injury: Patient reports running an Ironman on  in old shoes and developed in plantar fasciitis after the race. The pain was severe for about 3 weeks, however with rest the heel pain improved. As she returned to running one month after the race, she began to experienced R [place]'s pain while running. Several weeks ago the pain began on the left side as well and today she would rank the L>R. Currently she is able to bike without pain, but running continues to exacerbate symptoms. She is able to run for further distances (50-60 minutes), but is avoiding the longer distances required for her training as well as hill training or sprints. She wishes to return to these ASA, however her next Ironman is likely [employer] in July. Previous Treatment/Compliance: She has been seen at this clinic multiple times with excellent results. The most recent episode of care involved dry needling to the right and left calf areas and she responded very well. She wishes for today to focus on dry needling and she will have a f/u visit scheduled with Daniel Delatorre, where the POC will focus on SD. PMHx/Surgical Hx: The patient also reports chronic R knee pain that has returned with a return to running as well.  This was also successfully treated with the last round of PT and she wishes to have this addressed along with the Earl's pain. Work Hx:  with Ann  Living Situation: Lives at home with boyfriend  Pt Goals: to return to running at full ability and pain-free  Barriers: None  Motivation: very motivated  Substance use: none   FABQ Score: low  Cognition: A & O x 3        OBJECTIVE/EXAMINATION  Gait and Functional Mobility:  NT  Palpation: Multiple trigger points palpated throughout the right and left gastrocnemius.  Minor TTP along the proximal Earl's tendon on both sides  Swelling:none  Joint Mobility: NT     PROM:        R  L        Ankle DF  10  10  All other motions WNL          MMT:   Ankle DF  5/5  5/5  Ankle PF (in standing)4/5  4/5*  Ankle INV  5/5  5/5  Ankle EVR  5/5  5/5  *mild pain reported at reps 10-15 on both sides, test terminated at 15th rep due to pain    Neurological: Sensation: Intact  Special Tests:    SD testing to be done on the next follow-up visit with Chantel Galdamez, PT, DPT, [facility]       Modality rationale: Patient declined   Min Type Additional Details    [] Estim: []Att   []Unatt        []TENS instruct                  []IFC  []Premod   []NMES                     []Other:  []w/US   []w/ice   []w/heat  Position:  Location:    []  Traction: [] Cervical       []Lumbar                       [] Prone          []Supine                       []Intermittent   []Continuous Lbs:  [] before manual  [] after manual  []w/heat    []  Ultrasound: []Continuous   [] Pulsed at:                            []1MHz   []3MHz Location:  W/cm2:    []  Paraffin         Location:  []w/heat    []  Ice     []  Heat  []  Ice massage Position:  Location:    []  Laser  []  Other: Position:  Location:    []  Vasopneumatic Device Pressure:       [] lo [] med [] hi   Temperature:    [x] Skin assessment post-treatment:  [x]intact []redness- no adverse reaction    []redness  adverse reaction:     10 min Therapeutic Exercise:  [x] See flow sheet :   Rationale: increase ROM and increase proprioception to improve the patients ability to run without pain    30 min Manual Therapy:    DN was performed in prone to the medial and lateral heads on the right and left gastrocnemius. 3 trigger points were palpated in each area and 50 mm x .30 mm needles were inserted to full depth with fanning used to elicit LTR's. DN was then performed in prone to the right and left [place]'s tendon. 40 mm x .30 mm needles were inserted 20 mm tangentially into tendon beginning at the muscular insertion and extending distally to tenosseus junction. This was performed on both sides of the tendon for 8 needles on each [place]chi[place]'s. Rationale: decrease pain, increase ROM, increase tissue extensibility and decrease trigger points  to improve the patients ability to run without pain          With   [] TE   [] TA   [] neuro   [] other: Patient Education: [x] Review HEP    [] Progressed/Changed HEP based on:   [] positioning   [] body mechanics   [] transfers   [] heat/ice application    [] other:        Other Objective/Functional Measures:  Significant LTR's were elicited on the medial gastroc head of both calves, L>R    Pain Level (0-10 scale) post treatment: 0      ASSESSMENT:      [x]  See Plan of Care      Kam Nava, PT , DPT, OCS, Cert.  DN   1/30/2018  11:07 AM

## 2018-01-31 NOTE — PROGRESS NOTES
[address] [place] [address] Dara Sommers  Phone: [phone]  Fax: [phone]    Plan of Care/ Statement of Necessity for Physical Therapy Services 2-15    Patient name: Samir Warren  : 1990  Provider#: [id number]  Referral source: Referred, Self, MD      Medical/Treatment Diagnosis: Knee pain [M25.569]     Prior Hospitalization: see medical history     Comorbidities: None  Prior Level of Function: see initial eval  Medications: Verified on Patient Summary List    Start of Care: 18      Onset Date: 2017       The Plan of Care and following information is based on the information from the initial evaluation. Assessment/ key information: Patient presents with B [place]'s tendinitis and R patellofemoral pain following a return to running in 2017. Today she presented with reduced ankle ROM, impaired calf strength, and multiple trigger points palpated throughout both the right and left gastrocnemius muscle bellies. She did very well with dry needling to this musculature earlier today and should continue to respond well as she is gradually progressed back to prior level of function. Plan of care for the R knee pain will utilize [facility] concepts and the patient will be treated at this clinic by Lawson Francis, PT, DPT, PRC, while the dry needling treatment to the B lower leg will be performed on separate visits with Femi hWalen, PT , DPT, OCS, Cert. DN.  If you have any questions or concerns regarding this plan of care, please contact me at [phone] or [email or web address]      Evaluation Complexity History LOW Complexity : Zero comorbidities / personal factors that will impact the outcome / POC; Examination LOW Complexity : 1-2 Standardized tests and measures addressing body structure, function, activity limitation and / or participation in recreation  ;Presentation MEDIUM Complexity : Evolving with changing characteristics ;Clinical Decision Making LOW Complexity : FOTO score of   Overall Complexity Rating: LOW     Problem List: pain affecting function, decrease ROM, decrease strength, decrease ADL/ functional abilitiies, decrease activity tolerance and decrease flexibility/ joint mobility   Treatment Plan may include any combination of the following: Therapeutic exercise, Neuromuscular re-education, Physical agent/modality, Manual therapy, Patient education, Self Care training, Functional mobility training and Other: SD, dry needling  Patient / Family readiness to learn indicated by: asking questions, trying to perform skills and interest  Persons(s) to be included in education: patient (P)  Barriers to Learning/Limitations: None  Patient Goal (s): I want to be able to run for as long as I want to without pain.   Patient Self Reported Health Status: excellent  Rehabilitation Potential: excellent    Short Term Goals: To be accomplished in 8 treatments:  1. Patient will be able to demonstrate 5/5 B PF strength to allow for improved push off during gait cycle. 2. Patient will be able to run for 1 hour with no pain or limitation. 3. Patient will be able to demonstrate equal vertical leap distance with no pain reported. Long Term Goals: To be accomplished in 16 treatments:  1. Patient will be able to run for 2 hours with no pain or limitation. 2. Patient will be able to sprint 25 yards with no pain or limitation. 3. Patient will demonstrate equal 3 hop test with no pain reported. SD related goals will be added on a future date by Liz Rios, PT, DPT, PRC    Frequency / Duration: Patient to be seen 2 times per week for 8 weeks. Patient/ Caregiver education and instruction: self care, activity modification and exercises    [x]  Plan of care has been reviewed with JANTE Bashir, PT , DPT, OCS, Cert.  DN   1/30/2018 7:02 PM    ________________________________________________________________________    I certify that the above Therapy Services are being furnished while the patient is under my care. I agree with the treatment plan and certify that this therapy is necessary.     [de-identified] Signature:____________________  Date:____________Time: _________

## 2018-02-07 ENCOUNTER — HOSPITAL ENCOUNTER (OUTPATIENT)
Dept: PHYSICAL THERAPY | Age: 28
Discharge: HOME | End: 2018-02-07
Payer: COMMERCIAL

## 2018-02-07 PROCEDURE — 97164 PT RE-EVAL EST PLAN CARE: CPT | Performed by: PHYSICAL THERAPIST

## 2018-02-07 PROCEDURE — 97112 NEUROMUSCULAR REEDUCATION: CPT | Performed by: PHYSICAL THERAPIST

## 2018-02-07 NOTE — PROGRESS NOTES
PT Re-Evaluation and Daily NOTE 2-15    Patient Name: Duncan Ortez  Date:2018  : 1990  [x]  Patient  Verified  Payor: BLUE CROSS / Plan: Ashok Vergara [id number] PPO / Product Type: PPO /    In time:3:00 pm  Out time:3:50 pm  Total Treatment Time (min): 50  Visit #: 2     Treatment Area: Knee pain [M25.569]    SUBJECTIVE  Pain Level (0-10 scale): 0/10 at rest  Any medication changes, allergies to medications, adverse drug reactions, diagnosis change, or new procedure performed?: [x] No    [] Yes (see summary sheet for update)  Subjective functional status/changes:   [] No changes reported  Pt reports that her dry needling appointment with Alphonso went really well and that she was in less pain following the visit. She reports that she has been compliant with her D/C SD exercises and still is having pain when she is running after longer periods.     OBJECTIVE    See SD Evaluation Scanned into chart       30 min Neuromuscular Re-education:  [x]  See flow sheet :   Rationale: improve coordination, improve balance and increase proprioception  to improve the patients ability to return to active lifestyle without pain  *          With   [] TE   [] TA   [x] neuro   [] other: Patient Education: [x] Review HEP    [x] Progressed/Changed HEP based on:   [] positioning   [] body mechanics   [] transfers   [] heat/ice application    [] other:      Other Objective/Functional Measures: pt was in neutral positioning following her intervention today     Pain Level (0-10 scale) post treatment: 0/10    ASSESSMENT/Changes in Function:     Patient will continue to benefit from skilled PT services to modify and progress therapeutic interventions, address functional mobility deficits, address ROM deficits, address strength deficits, analyze and address soft tissue restrictions, analyze and cue movement patterns, analyze and modify body mechanics/ergonomics, assess and modify postural abnormalities, address imbalance/dizziness and instruct in home and community integration to attain remaining goals.      [x]  See Plan of Care by Shiloh Samuel from previous visit; Goals as listed below to be added into POC  []  See progress note/recertification  []  See Discharge Summary         Progress towards goals / Updated goals:  Short Term Goals:   1) Patient will demonstrate Negative Hruska Adduction Drop Test   2) Patient will demonstrate independence with HEP  3) Patient will demonstrate greater than Grade II on Hruska Adduction Lift Test    Long Term Goals:  1)Patient will demonstrate Grade IV or Grade V Hruska Adduction Lift Score to allow for functional mobility in home and community settings  2)Pt will demonstrate the ability to ambulate forward and backward achieving bilateral Acetabular Femoral Internal Rotation for pain free mobility  3)Pt will demonstrate the ability to jog, swim and run without subjective complaints or gait deviation  4)Pt will demonstrate independence with discharge HEP to allow for ambulation, sitting and standing without objective dysfunction    PLAN  []  Upgrade activities as tolerated       [x]  Continue plan of care by Shiloh Samuel with inclusion of STG and LTG as listed above  [x]  Update interventions per flow sheet       []  Discharge due to:_  [x]  Other:_        Adam Ochoa, PT, DPT, [facility] 2/7/2018  4:55 PM

## 2018-02-14 ENCOUNTER — APPOINTMENT (OUTPATIENT)
Dept: PHYSICAL THERAPY | Age: 28
End: 2018-02-14

## 2018-02-20 ENCOUNTER — APPOINTMENT (OUTPATIENT)
Dept: PHYSICAL THERAPY | Age: 28
End: 2018-02-20

## 2018-02-21 ENCOUNTER — HOSPITAL ENCOUNTER (OUTPATIENT)
Dept: PHYSICAL THERAPY | Age: 28
Discharge: HOME | End: 2018-02-21
Payer: COMMERCIAL

## 2018-02-21 PROCEDURE — 97140 MANUAL THERAPY 1/> REGIONS: CPT | Performed by: PHYSICAL THERAPIST

## 2018-02-21 PROCEDURE — 97014 ELECTRIC STIMULATION THERAPY: CPT | Performed by: PHYSICAL THERAPIST

## 2018-02-21 PROCEDURE — 97112 NEUROMUSCULAR REEDUCATION: CPT | Performed by: PHYSICAL THERAPIST

## 2018-02-21 NOTE — PROGRESS NOTES
PT Daily NOTE 2-15    Patient Name: Yon Merino  Date:2018  : 1990  [x]  Patient  Verified  Payor: BLUE CROSS / Plan: Ashok Vergara [id number] PPO / Product Type: PPO /    In time: 2:00 pm  Out time:3:20 pm  Total Treatment Time (min): 80  Visit #: 3     Treatment Area: Knee pain [M25.569]    SUBJECTIVE  Pain Level (0-10 scale): 0/10 at rest  Any medication changes, allergies to medications, adverse drug reactions, diagnosis change, or new procedure performed?: [x] No    [] Yes (see summary sheet for update)  Subjective functional status/changes:   [] No changes reported  Pt reports that overall she has been doing well and that she has been able to run and ride longer without pain present. She does report some ITB pain and some achilles pain, however it is intermittant in nature. OBJECTIVE    30 min Neuromuscular Re-education:  [x]  See flow sheet  For SD intervention   Rationale: improve coordination, improve balance and increase proprioception  to improve the patients ability to return to active lifestyle without pain    30 min Manual Therapy:     Rationale: improve coordination, improve balance and increase proprioception  to improve the patients ability to return to active lifestyle without pain    DN was first performed in left sidelying to musculature along the right lateral hip. The first two needles were placed along trigger points located at the middle 1/3 of the piriformis and inferior glut hunter followed by 2 needles at the lateral 1/3 of these two muscles. Needles were then inserted along the tenosseous junction for the piriformis, superior and inferior gemellus, and quadtraus femoris. 50 mm x .30 mm needles were used throughout. DN was then performed in prone to the medial and lateral heads on the right and left gastrocnemius. 3 trigger points were palpated in each area and 60 mm x .30 mm needles were inserted to full depth with fanning used to elicit LTR's.       DN was then performed in prone to the right and left Earl's tendon. 40 mm x .30 mm needles were inserted 20 mm tangentially into tendon beginning at the muscular insertion and extending distally to tenosseus junction. This was performed on both sides of the tendon for 8 needles on each [place]'s. Modalities:  E-stim, unattended, other: asymmetrical, biphasic, 300 usec, 2 Hz, position: left sidelying, location: right glute          With   [] TE   [] TA   [x] neuro   [] other: Patient Education: [x] Review HEP    [x] Progressed/Changed HEP based on:   [] positioning   [] body mechanics   [] transfers   [] heat/ice application    [] other:      Other Objective/Functional Measures:   R PADT +  - HGIR bilaterally  Bilateral HaDLT 3/5     Pain Level (0-10 scale) post treatment: 0/10    ASSESSMENT/Changes in Function:     Patient will continue to benefit from skilled PT services to modify and progress therapeutic interventions, address functional mobility deficits, address ROM deficits, address strength deficits, analyze and address soft tissue restrictions, analyze and cue movement patterns, analyze and modify body mechanics/ergonomics, assess and modify postural abnormalities, address imbalance/dizziness and instruct in home and community integration to attain remaining goals. []  See Plan of Care  []  See progress note/recertification  []  See Discharge Summary         Progress towards goals / Updated goals:  Progressing toward all STG and LTG and is tolerating SD and D.N. Well with N tissue tolerance        PLAN  []  Upgrade activities as tolerated       [x]  Continue plan of care by Tegan Hallman with inclusion of STG and LTG as listed above  [x]  Update interventions per flow sheet       []  Discharge due to:_  [x]  Other: cont with dry needling and SD interventions per Tegan Hallman, PT, DPT, cert D.N. Ashleigh Pruitt, PT, DPT, PRC and Ashby Burkitt, PT, DPT, cert.  D.N. 2/21/2018  4:55 PM

## 2018-02-26 ENCOUNTER — APPOINTMENT (OUTPATIENT)
Dept: PHYSICAL THERAPY | Age: 28
End: 2018-02-26
Payer: COMMERCIAL

## 2018-02-27 ENCOUNTER — APPOINTMENT (OUTPATIENT)
Dept: PHYSICAL THERAPY | Age: 28
End: 2018-02-27

## 2018-02-28 ENCOUNTER — HOSPITAL ENCOUNTER (OUTPATIENT)
Dept: PHYSICAL THERAPY | Age: 28
Discharge: HOME | End: 2018-02-28
Payer: COMMERCIAL

## 2018-02-28 PROCEDURE — 97014 ELECTRIC STIMULATION THERAPY: CPT | Performed by: PHYSICAL THERAPIST

## 2018-02-28 PROCEDURE — 97112 NEUROMUSCULAR REEDUCATION: CPT | Performed by: PHYSICAL THERAPIST

## 2018-02-28 PROCEDURE — 97140 MANUAL THERAPY 1/> REGIONS: CPT | Performed by: PHYSICAL THERAPIST

## 2018-02-28 NOTE — PROGRESS NOTES
PT DAILY TREATMENT NOTE 2-15    Patient Name: Ludy Qiu  Date:2018  : 1990  [x]  Patient  Verified  Payor: BLUE CROSS / Plan: Ashok Vergara [id number] PPO / Product Type: PPO /    In time:2:00 pm  Out time:3:00 pm  Total Treatment Time (min): 60  Visit #: 4     Treatment Area: Knee pain [M25.569]    SUBJECTIVE  Pain Level (0-10 scale): 0/10 at rest  Any medication changes, allergies to medications, adverse drug reactions, diagnosis change, or new procedure performed?: [x] No    [] Yes (see summary sheet for update)  Subjective functional status/changes:   [] No changes reported  Pt reports that she is able to be active and walk and do activities with less pain present.     OBJECTIVE    Modality rationale: decrease inflammation, decrease pain and increase tissue extensibility to improve the patients ability to ambulate with less pain   Min Type Additional Details   15 [x] Estim: []Att   []Unatt        []TENS instruct                  []IFC  []Premod   []NMES                     [x]Other: asymmetrical, biphasic, 300 usec, 2 Hz, position: []w/US   []w/ice   []w/heat  Position: left sidelying   Location:right glute    []  Traction: [] Cervical       []Lumbar                       [] Prone          []Supine                       []Intermittent   []Continuous Lbs:  [] before manual  [] after manual  []w/heat    []  Ultrasound: []Continuous   [] Pulsed at:                            []1MHz   []3MHz Location:  W/cm2:    []  Paraffin         Location:  []w/heat    []  Ice     []  Heat  []  Ice massage Position:  Location:    []  Laser  []  Other: Position:  Location:    []  Vasopneumatic Device Pressure:       [] lo [] med [] hi   Temperature:    [x] Skin assessment post-treatment:  [x]intact [x]redness- no adverse reaction    []redness  adverse reaction:        30 min Neuromuscular Re-education:  [x]  See flow sheet : for SD intervention   Rationale: increase ROM, increase strength, improve coordination, improve balance and increase proprioception  to improve the patients ability to ambulate without pain    15 min Manual Therapy:  DN was performed in prone to the medial and lateral heads of the right and left gastrocnemius. 3 trigger points were palpated in each area and 60 mm x .30 mm needles were inserted to full depth with fanning used to elicit LTR's. DN was then performed in prone to the right and left Earl's tendon. 40 mm x .30 mm needles were inserted 20 mm tangentially into tendon beginning at the muscular insertion and extending distally to tenosseus junction. This was performed on both sides of the tendon for 4 needles on each tendon. Rationale: decrease pain, increase tissue extensibility, correct positional vertigo and decrease trigger points  to improve the patients ability to ambulate without pain            With   [] TE   [] TA   [] neuro   [] other: Patient Education: [x] Review HEP    [] Progressed/Changed HEP based on:   [] positioning   [] body mechanics   [] transfers   [] heat/ice application    [] other:      Other Objective/Functional Measures: HaDLT bilaterally Level III  HGIR bilaterally -    PADT bilaterally -  Apical expansion bilaterally is good    Pain Level (0-10 scale) post treatment: 0/10    ASSESSMENT/Changes in Function:     Patient will continue to benefit from skilled PT services to modify and progress therapeutic interventions, address functional mobility deficits, address ROM deficits, address strength deficits, analyze and address soft tissue restrictions, analyze and cue movement patterns, analyze and modify body mechanics/ergonomics, assess and modify postural abnormalities, address imbalance/dizziness and instruct in home and community integration to attain remaining goals. []  See Plan of Care  []  See progress note/recertification  []  See Discharge Summary         Progress towards goals / Updated goals:  Short Term Goals:  All Met at today's visit  1) Patient will demonstrate Negative Hruska Adduction Drop Test   2) Patient will demonstrate independence with HEP  3) Patient will demonstrate greater than Grade II on Hruska Adduction Lift Test    PLAN  []  Upgrade activities as tolerated     [x]  Continue plan of care  [x]  Update interventions per flow sheet       []  Discharge due to:_  [x]  Other:      Meena Ruiz, PT, DPT, [facility] 2/28/2018  2:04 PM

## 2018-03-05 ENCOUNTER — APPOINTMENT (OUTPATIENT)
Dept: PHYSICAL THERAPY | Age: 28
End: 2018-03-05
Payer: COMMERCIAL

## 2018-03-06 ENCOUNTER — APPOINTMENT (OUTPATIENT)
Dept: PHYSICAL THERAPY | Age: 28
End: 2018-03-06

## 2018-03-07 ENCOUNTER — HOSPITAL ENCOUNTER (OUTPATIENT)
Dept: PHYSICAL THERAPY | Age: 28
Discharge: HOME | End: 2018-03-07
Payer: COMMERCIAL

## 2018-03-07 PROCEDURE — 97110 THERAPEUTIC EXERCISES: CPT | Performed by: PHYSICAL THERAPIST

## 2018-03-07 PROCEDURE — 97112 NEUROMUSCULAR REEDUCATION: CPT | Performed by: PHYSICAL THERAPIST

## 2018-03-07 NOTE — PROGRESS NOTES
PT DAILY TREATMENT NOTE 2-15    Patient Name: Susan Olmos  Date:3/7/2018  : 1990  [x]  Patient  Verified  Payor: BLUE CROSS / Plan: Ashok Vergara [id number] PPO / Product Type: PPO /    In time:2:00 pm  Out time:3:00 pm  Total Treatment Time (min): 60  Visit #: 4     Treatment Area: Knee pain [M25.569]    SUBJECTIVE  Pain Level (0-10 scale): 0/10 at rest  Any medication changes, allergies to medications, adverse drug reactions, diagnosis change, or new procedure performed?: [x] No    [] Yes (see summary sheet for update)  Subjective functional status/changes:   [] No changes reported  Patient reports that she ran for 80 minutes continuously over the weekend, which is furthest she has run since starting PT. She also has a half marathon coming up in two weeks. She experienced 3-4/10 right knee pain after the run and some significant B calf soreness.     OBJECTIVE    Modality rationale: decrease inflammation, decrease pain and increase tissue extensibility to improve the patients ability to ambulate with less pain   Min Type Additional Details   15 [x] Estim: []Att   []Unatt        []TENS instruct                  []IFC  []Premod   []NMES                     [x]Other: asymmetrical, biphasic, 300 usec, 2 Hz, position: []w/US   []w/ice   []w/heat  Position: left sidelying   Location:right glute    []  Traction: [] Cervical       []Lumbar                       [] Prone          []Supine                       []Intermittent   []Continuous Lbs:  [] before manual  [] after manual  []w/heat    []  Ultrasound: []Continuous   [] Pulsed at:                            []1MHz   []3MHz Location:  W/cm2:    []  Paraffin         Location:  []w/heat    []  Ice     []  Heat  []  Ice massage Position:  Location:    []  Laser  []  Other: Position:  Location:    []  Vasopneumatic Device Pressure:       [] lo [] med [] hi   Temperature:    [x] Skin assessment post-treatment:  [x]intact [x]redness- no adverse reaction []redness  adverse reaction:        30 min Neuromuscular Re-education:  [x]  See flow sheet : for SD intervention   Rationale: increase ROM, increase strength, improve coordination, improve balance and increase proprioception  to improve the patients ability to ambulate without pain    15 min Manual Therapy:  DN was performed in prone to the medial and lateral heads of the right and left gastrocnemius. 3 trigger points were palpated in each area and 60 mm x .30 mm needles were inserted to full depth with fanning used to elicit LTR's. DN was then performed in prone to the right and left Earl's tendon. 40 mm x .30 mm needles were inserted 20 mm tangentially into tendon beginning at the muscular insertion and extending distally to tenosseus junction. This was performed on both sides of the tendon for 4 needles on each tendon. Rationale: decrease pain, increase tissue extensibility, correct positional vertigo and decrease trigger points  to improve the patients ability to ambulate without pain            With   [] TE   [] TA   [] neuro   [] other: Patient Education: [x] Review HEP    [] Progressed/Changed HEP based on:   [] positioning   [] body mechanics   [] transfers   [] heat/ice application    [] other:      Other Objective/Functional Measures:   Significant LTR's elicited at the right medial gastrocnemius head and left lateral gastrocnemius head    Pain Level (0-10 scale) post treatment: 0/10    ASSESSMENT/Changes in Function:     Patient will continue to benefit from skilled PT services to modify and progress therapeutic interventions, address functional mobility deficits, address ROM deficits, address strength deficits, analyze and address soft tissue restrictions, analyze and cue movement patterns, analyze and modify body mechanics/ergonomics, assess and modify postural abnormalities, address imbalance/dizziness and instruct in home and community integration to attain remaining goals.      []  See Plan of Care  []  See progress note/recertification  []  See Discharge Summary         Progress towards goals / Updated goals:  Patient continues to do very well with PT with significant improvement in both muscular strength and endurance and decreased functional limitations with running. She will continue to benefit from co-treatment with SD-based neuromuscular re-education and dry needling for further inhibition. PLAN  []  Upgrade activities as tolerated     [x]  Continue plan of care  [x]  Update interventions per flow sheet       []  Discharge due to:_  [x]  Other:      Letty Kaiser, PT, DPT, OCS, Cert.  RENÉ Lopez, PT, DPT, Psychiatric     3/7/2018  2:04 PM

## 2018-03-28 ENCOUNTER — HOSPITAL ENCOUNTER (OUTPATIENT)
Dept: PHYSICAL THERAPY | Age: 28
Discharge: HOME | End: 2018-03-28
Payer: COMMERCIAL

## 2018-03-28 PROCEDURE — 97112 NEUROMUSCULAR REEDUCATION: CPT | Performed by: PHYSICAL THERAPIST

## 2018-03-28 PROCEDURE — 97140 MANUAL THERAPY 1/> REGIONS: CPT | Performed by: PHYSICAL THERAPIST

## 2018-03-28 NOTE — PROGRESS NOTES
PT DAILY TREATMENT NOTE 2-15    Patient Name: Richy Farley  Date:3/28/2018  : 1990  [x]  Patient  Verified  Payor: BLUE CROSS / Plan: [facility] PPO / Product Type: PPO /    In time:2:00 pm  Out time:3:10 pm  Total Treatment Time (min): 70  Visit #: 6     Treatment Area: Pain in right knee [M25.561]    SUBJECTIVE  Pain Level (0-10 scale): 0/10 at rest; 6/10 with some activity; 9/10 pain with running this morning  Any medication changes, allergies to medications, adverse drug reactions, diagnosis change, or new procedure performed?: [x] No    [] Yes (see summary sheet for update)  Subjective functional status/changes:   [] No changes reported  Pt reports that she is doing pretty well overall. She reports that she ran a half marathon and did well, but now she is having knee and piriformis pain. She reports that she talked to another PT that said that it was her piriformis on the right side that is causing her problems.     OBJECTIVE    Modality rationale: decrease inflammation, decrease pain and increase tissue extensibility to improve the patients ability to ambulate with less pain   Min Type Additional Details   15 [x] Estim: []Att   []Unatt        []TENS instruct                  []IFC  []Premod   []NMES                     [x]Other: asymmetrical, biphasic, 300 usec, 2 Hz,  []w/US   []w/ice   []w/heat  Position: left sidelying   Location:right glute    []  Traction: [] Cervical       []Lumbar                       [] Prone          []Supine                       []Intermittent   []Continuous Lbs:  [] before manual  [] after manual  []w/heat    []  Ultrasound: []Continuous   [] Pulsed at:                            []1MHz   []3MHz Location:  W/cm2:    []  Paraffin         Location:  []w/heat    []  Ice     []  Heat  []  Ice massage Position:  Location:    []  Laser  []  Other: Position:  Location:    []  Vasopneumatic Device Pressure:       [] lo [] med [] hi   Temperature:    [x] Skin assessment post-treatment:  [x]intact [x]redness- no adverse reaction    []redness  adverse reaction:        30 min Neuromuscular Re-education:  [x]  See flow sheet : for SD intervention   Rationale: increase ROM, increase strength, improve coordination, improve balance and increase proprioception  to improve the patients ability to ambulate without pain    15 min Manual Therapy:  DN was performed in prone to the medial and lateral heads of the right and left gastrocnemius. 3 trigger points were palpated in each area and 60 mm x .30 mm needles were inserted to full depth with fanning used to elicit LTR's. DN was then performed in prone to the right and left Earl's tendon. 40 mm x .30 mm needles were inserted 20 mm tangentially into tendon beginning at the muscular insertion and extending distally to tenosseus junction. This was performed on both sides of the tendon for 4 needles on each tendon. Rationale: decrease pain, increase tissue extensibility, correct positional vertigo and decrease trigger points  to improve the patients ability to ambulate without pain            With   [] TE   [] TA   [] neuro   [] other: Patient Education: [x] Review HEP    [] Progressed/Changed HEP based on:   [] positioning   [] body mechanics   [] transfers   [] heat/ice application    [] other:      Other Objective/Functional Measures:   + R HGIR  + PADT L       Pain Level (0-10 scale) post treatment: 0/10    ASSESSMENT/Changes in Function:     Patient will continue to benefit from skilled PT services to modify and progress therapeutic interventions, address functional mobility deficits, address ROM deficits, address strength deficits, analyze and address soft tissue restrictions, analyze and cue movement patterns, analyze and modify body mechanics/ergonomics, assess and modify postural abnormalities, address imbalance/dizziness and instruct in home and community integration to attain remaining goals.      []  See Plan of Care  []  See progress note/recertification  []  See Discharge Summary         Progress towards goals / Updated goals:  Patient continues to do very well with PT with significant improvement in both muscular strength and endurance and decreased functional limitations with running. She will continue to benefit from co-treatment with SD-based neuromuscular re-education and dry needling for further inhibition. Pt is affected by anxiety regarding current condition and prognosis and this may be limiting progress with HEP. PLAN  []  Upgrade activities as tolerated     [x]  Continue plan of care  [x]  Update interventions per flow sheet       []  Discharge due to:_  [x]  Other:      Dorcas Yanes, PT, DPT, OCS, Cert.  RENÉ Navarro, PT, DPT, [facility]     3/28/2018  2:25 PM

## 2018-04-16 ENCOUNTER — APPOINTMENT (OUTPATIENT)
Dept: PHYSICAL THERAPY | Age: 28
End: 2018-04-16
Payer: COMMERCIAL

## 2018-04-25 ENCOUNTER — HOSPITAL ENCOUNTER (OUTPATIENT)
Dept: PHYSICAL THERAPY | Age: 28
Discharge: HOME | End: 2018-04-25
Payer: COMMERCIAL

## 2018-04-25 PROCEDURE — 97140 MANUAL THERAPY 1/> REGIONS: CPT | Performed by: PHYSICAL THERAPIST

## 2018-04-25 PROCEDURE — 97112 NEUROMUSCULAR REEDUCATION: CPT | Performed by: PHYSICAL THERAPIST

## 2018-04-25 PROCEDURE — 97014 ELECTRIC STIMULATION THERAPY: CPT | Performed by: PHYSICAL THERAPIST

## 2018-04-25 NOTE — PROGRESS NOTES
PT DAILY TREATMENT NOTE 2-15    Patient Name: Kosta Lopez  Date:2018  : 1990  [x]  Patient  Verified  Payor: BLUE CROSS / Plan: Ashok Vergara [id number] PPO / Product Type: PPO /    In time:2:00 pm  Out time:3:10 pm  Total Treatment Time (min): 70  Visit #: 7    Treatment Area: Pain in right knee [M25.561]    SUBJECTIVE  Pain Level (0-10 scale): 0/10 at rest  Any medication changes, allergies to medications, adverse drug reactions, diagnosis change, or new procedure performed?: [x] No    [] Yes (see summary sheet for update)  Subjective functional status/changes:   [] No changes reported  Pt reports that she has been doing great with very little symptoms present.     OBJECTIVE    Modality rationale: decrease inflammation, decrease pain and increase tissue extensibility to improve the patients ability to ambulate with less pain   Min Type Additional Details   15 [x] Estim: []Att   []Unatt        []TENS instruct                  []IFC  []Premod   []NMES                     [x]Other: asymmetrical, biphasic, 300 usec, 2 Hz,  []w/US   []w/ice   []w/heat  Position: left sidelying   Location:right glute    []  Traction: [] Cervical       []Lumbar                       [] Prone          []Supine                       []Intermittent   []Continuous Lbs:  [] before manual  [] after manual  []w/heat    []  Ultrasound: []Continuous   [] Pulsed at:                            []1MHz   []3MHz Location:  W/cm2:    []  Paraffin         Location:  []w/heat    []  Ice     []  Heat  []  Ice massage Position:  Location:    []  Laser  []  Other: Position:  Location:    []  Vasopneumatic Device Pressure:       [] lo [] med [] hi   Temperature:    [x] Skin assessment post-treatment:  [x]intact [x]redness- no adverse reaction    []redness  adverse reaction:        30 min Neuromuscular Re-education:  [x]  See flow sheet : for SD intervention   Rationale: increase ROM, increase strength, improve coordination, improve balance and increase proprioception  to improve the patients ability to ambulate without pain    15 min Manual Therapy:    DN was first performed in left sidelying to musculature along the right lateral hip. The first two needles were placed along trigger points located at the middle 1/3 of the piriformis and inferior glut hunter followed by 2 needles at the lateral 1/3 of these two muscles. Needles were then inserted along the tenosseous junction for the piriformis, superior and inferior gemellus, and quadtraus femoris. 50 mm x .30 mm needles were used throughout. Laneta Shell was used to elicit multiple LTR's and winding was used to further stimulate the muscular tissue to promote inhibition of the posterior capsule of the hip. Rationale: decrease pain, increase tissue extensibility, correct positional vertigo and decrease trigger points  to improve the patients ability to ambulate without pain            With   [] TE   [] TA   [] neuro   [] other: Patient Education: [x] Review HEP    [] Progressed/Changed HEP based on:   [] positioning   [] body mechanics   [] transfers   [] heat/ice application    [] other:      Other Objective/Functional Measures:   - R HGIR  - PADT L       Pain Level (0-10 scale) post treatment: 0/10    ASSESSMENT/Changes in Function:     Patient will continue to benefit from skilled PT services to modify and progress therapeutic interventions, address functional mobility deficits, address ROM deficits, address strength deficits, analyze and address soft tissue restrictions, analyze and cue movement patterns, analyze and modify body mechanics/ergonomics, assess and modify postural abnormalities, address imbalance/dizziness and instruct in home and community integration to attain remaining goals.      []  See Plan of Care  []  See progress note/recertification  []  See Discharge Summary         Progress towards goals / Updated goals:  Patient continues to do very well with PT with significant improvement in both muscular strength and endurance and decreased functional limitations with running. She will continue to benefit from co-treatment with SD-based neuromuscular re-education and dry needling for further inhibition. Pt is affected by anxiety regarding current condition and prognosis and this may be limiting progress with HEP. PLAN  []  Upgrade activities as tolerated     [x]  Continue plan of care  [x]  Update interventions per flow sheet       []  Discharge due to:_  [x]  Other: cont PRN       Yari Current, PT, DPT, OCS, Cert.  DN  Luz Corrigan, PT, DPT, [facility]     4/25/2018  2:25 PM

## 2018-05-02 ENCOUNTER — HOSPITAL ENCOUNTER (OUTPATIENT)
Dept: PHYSICAL THERAPY | Age: 28
Discharge: HOME | End: 2018-05-02
Payer: COMMERCIAL

## 2018-05-02 PROCEDURE — 97140 MANUAL THERAPY 1/> REGIONS: CPT | Performed by: PHYSICAL THERAPIST

## 2018-05-02 PROCEDURE — 97112 NEUROMUSCULAR REEDUCATION: CPT | Performed by: PHYSICAL THERAPIST

## 2018-05-02 PROCEDURE — 97014 ELECTRIC STIMULATION THERAPY: CPT | Performed by: PHYSICAL THERAPIST

## 2018-05-02 NOTE — PROGRESS NOTES
PT DAILY TREATMENT NOTE 2-15    Patient Name: Chetan Nolen  Date:2018  : 1990  [x]  Patient  Verified  Payor: BLUE CROSS / Plan: [facility] PPO / Product Type: PPO /    In time:2:00 pm  Out time:3:10 pm  Total Treatment Time (min): 70  Visit #: 8    Treatment Area: Pain in right knee [M25.561]    SUBJECTIVE  Pain Level (0-10 scale): 0/10 at rest; 5/10 with activity  Any medication changes, allergies to medications, adverse drug reactions, diagnosis change, or new procedure performed?: [x] No    [] Yes (see summary sheet for update)  Subjective functional status/changes:   [] No changes reported  Pt reports that she doesn't understand why she was really uncomfortable on her recent runs this week. She was also uncomfortable in her right lateral hip and lateral knee. She was also sore when she was aqua jogging this morning.     OBJECTIVE    Modality rationale: decrease inflammation, decrease pain and increase tissue extensibility to improve the patients ability to ambulate with less pain   Min Type Additional Details   15 [x] Estim: []Att   []Unatt        []TENS instruct                  []IFC  []Premod   []NMES                     [x]Other: asymmetrical, biphasic, 300 usec, 2 Hz,  []w/US   []w/ice   []w/heat  Position: left sidelying   Location:right glute    []  Traction: [] Cervical       []Lumbar                       [] Prone          []Supine                       []Intermittent   []Continuous Lbs:  [] before manual  [] after manual  []w/heat    []  Ultrasound: []Continuous   [] Pulsed at:                            []1MHz   []3MHz Location:  W/cm2:    []  Paraffin         Location:  []w/heat    []  Ice     []  Heat  []  Ice massage Position:  Location:    []  Laser  []  Other: Position:  Location:    []  Vasopneumatic Device Pressure:       [] lo [] med [] hi   Temperature:    [x] Skin assessment post-treatment:  [x]intact [x]redness- no adverse reaction    []redness  adverse reaction:        30 min Neuromuscular Re-education:  [x]  See flow sheet : for SD intervention   Rationale: increase ROM, increase strength, improve coordination, improve balance and increase proprioception  to improve the patients ability to ambulate without pain    20 min Manual Therapy:    DN was first performed in left sidelying to musculature along the right lateral hip. The first two needles were placed along trigger points located at the middle 1/3 of the piriformis and inferior glut hunter followed by 2 needles at the lateral 1/3 of these two muscles. Needles were then inserted along the tenosseous junction for the piriformis, superior and inferior gemellus, and quadtraus femoris. 50 mm x .30 mm needles were used throughout. Rheta Mealy was used to elicit multiple LTR's and winding was used to further stimulate the muscular tissue to promote inhibition of the posterior capsule of the hip.      Rationale: decrease pain, increase tissue extensibility, correct positional vertigo and decrease trigger points  to improve the patients ability to ambulate without pain            With   [] TE   [] TA   [x] neuro   [] other: Patient Education: [x] Review HEP    [] Progressed/Changed HEP based on:   [] positioning   [] body mechanics   [] transfers   [] heat/ice application    [] other:      Other Objective/Functional Measures:   + Bilateral HGIR  + PADT L   HadLT bilaterally 3+/5    Pain Level (0-10 scale) post treatment: 0/10      ASSESSMENT/Changes in Function:     Patient will continue to benefit from skilled PT services to modify and progress therapeutic interventions, address functional mobility deficits, address ROM deficits, address strength deficits, analyze and address soft tissue restrictions, analyze and cue movement patterns, analyze and modify body mechanics/ergonomics, assess and modify postural abnormalities, address imbalance/dizziness and instruct in home and community integration to attain remaining goals.     []  See Plan of Care  []  See progress note/recertification  []  See Discharge Summary         Progress towards goals / Updated goals:  Pt continues to wax and wane in her symptomatolgy despite stated compliance with HEP. Pt HEP is comprehensive and extensive and should progress her toward LTG at this time. PLAN  []  Upgrade activities as tolerated     [x]  Continue plan of care  [x]  Update interventions per flow sheet       []  Discharge due to:_  [x]  Other: cont PRN with 2-3 more visits of SD and CHELSEA Sunshine, PT, DPT, OCS, Cert.  RENÉ Lincoln, PT, DPT, [facility]     5/2/2018  2:25 PM

## 2018-05-09 ENCOUNTER — APPOINTMENT (OUTPATIENT)
Dept: PHYSICAL THERAPY | Age: 28
End: 2018-05-09
Payer: COMMERCIAL

## 2018-05-16 ENCOUNTER — HOSPITAL ENCOUNTER (OUTPATIENT)
Dept: PHYSICAL THERAPY | Age: 28
Discharge: HOME | End: 2018-05-16
Payer: COMMERCIAL

## 2018-05-16 PROCEDURE — 97014 ELECTRIC STIMULATION THERAPY: CPT | Performed by: PHYSICAL THERAPIST

## 2018-05-16 PROCEDURE — 97140 MANUAL THERAPY 1/> REGIONS: CPT | Performed by: PHYSICAL THERAPIST

## 2018-05-16 PROCEDURE — 97112 NEUROMUSCULAR REEDUCATION: CPT | Performed by: PHYSICAL THERAPIST

## 2018-05-17 NOTE — PROGRESS NOTES
PT DAILY TREATMENT NOTE 2-15    Patient Name: Chuyita Soria  Date:2018  : 1990  [x]  Patient  Verified  Payor: BLUE CROSS / Plan: [id number] PPO / Product Type: PPO /    In time:2:00 pm  Out time:3:10 pm  Total Treatment Time (min): 70  Visit #: 9    Treatment Area: Pain in right knee [M25.561]    SUBJECTIVE  Pain Level (0-10 scale): 5/10  Any medication changes, allergies to medications, adverse drug reactions, diagnosis change, or new procedure performed?: [x] No    [] Yes (see summary sheet for update)  Subjective functional status/changes:   [] No changes reported  Pt reports that she went on a long run a few days ago and now is having a lot of right foot pain that hurts at rest, when she is WB and she is worried that this is a stress fracture.     OBJECTIVE    Modality rationale: decrease inflammation, decrease pain and increase tissue extensibility to improve the patients ability to ambulate with less pain   Min Type Additional Details   15 [x] Estim: []Att   []Unatt        []TENS instruct                  []IFC  []Premod   []NMES                     [x]Other: asymmetrical, biphasic, 300 usec, 2 Hz,  []w/US   []w/ice   []w/heat  Position: left sidelying   Location:right glute    []  Traction: [] Cervical       []Lumbar                       [] Prone          []Supine                       []Intermittent   []Continuous Lbs:  [] before manual  [] after manual  []w/heat    []  Ultrasound: []Continuous   [] Pulsed at:                            []1MHz   []3MHz Location:  W/cm2:    []  Paraffin         Location:  []w/heat    []  Ice     []  Heat  []  Ice massage Position:  Location:    []  Laser  []  Other: Position:  Location:    []  Vasopneumatic Device Pressure:       [] lo [] med [] hi   Temperature:    [x] Skin assessment post-treatment:  [x]intact [x]redness- no adverse reaction    []redness  adverse reaction:        20 min Neuromuscular Re-education:  [x]  See flow sheet : for [facility] intervention   Rationale: increase ROM, increase strength, improve coordination, improve balance and increase proprioception  to improve the patients ability to ambulate without pain    30 min Manual Therapy and Neuromuscular Re-education :    DN was performed in prone to the muscle belly of the following musculature:                  Tensor fascia latae: two finger breaths distal to the iliac crest, 1 x 60                mm x .30 mm needle was used  Glut med: Beginning at greater trochanter attachment and then extending medially every two finger breaths. The needles were inserted two finger breaths inferior to the iliac crest. 4 needles total, 60 mm x .30 mm needles were used throughout, inserted to a bony contact. DN was then performed in prone to the muscle belly of abductor digiti minimi beginning at the head of the 5th metatarsal and extending proximal towards the base, 3 x 30 mm x .20 needles were used total. Two needles were then inserted onto the calcaneal attachments of flexor hallucis brevis and quadratus plantae. No needle manipulation was performed due to patient acuity.      Rationale: decrease pain, increase tissue extensibility, correct positional vertigo and decrease trigger points  to improve the patients ability to ambulate without pain            With   [] TE   [] TA   [x] neuro   [] other: Patient Education: [x] Review HEP    [] Progressed/Changed HEP based on:   [] positioning   [] body mechanics   [] transfers   [] heat/ice application    [] other:      Other Objective/Functional Measures:   - Bilateral HGIR  - PADT L   HadLT bilaterally 3+/5    Pain Level (0-10 scale) post treatment: 0/10      ASSESSMENT/Changes in Function:     Patient will continue to benefit from skilled PT services to modify and progress therapeutic interventions, address functional mobility deficits, address ROM deficits, address strength deficits, analyze and address soft tissue restrictions, analyze and cue movement patterns, analyze and modify body mechanics/ergonomics, assess and modify postural abnormalities, address imbalance/dizziness and instruct in home and community integration to attain remaining goals. []  See Plan of Care  []  See progress note/recertification  []  See Discharge Summary         Progress towards goals / Updated goals:  Right foot pain of new and insidious onset is limiting functional ability at this time. PLAN  []  Upgrade activities as tolerated     [x]  Continue plan of care  [x]  Update interventions per flow sheet       []  Discharge due to:_  [x]  Other: f/u PRN       Peggy Pablo, PT, DPT, OCS, Cert.  DN Vivek Stallings, PT, DPT, [facility]     5/17/2018  2:25 PM

## 2018-06-01 ENCOUNTER — APPOINTMENT (OUTPATIENT)
Dept: PHYSICAL THERAPY | Age: 28
End: 2018-06-01
Payer: COMMERCIAL

## 2018-06-12 ENCOUNTER — HOSPITAL ENCOUNTER (OUTPATIENT)
Dept: PHYSICAL THERAPY | Age: 28
Discharge: HOME | End: 2018-06-12
Payer: COMMERCIAL

## 2018-06-12 PROCEDURE — 97014 ELECTRIC STIMULATION THERAPY: CPT | Performed by: PHYSICAL THERAPIST

## 2018-06-12 PROCEDURE — 97140 MANUAL THERAPY 1/> REGIONS: CPT | Performed by: PHYSICAL THERAPIST

## 2018-06-12 NOTE — PROGRESS NOTES
PT DAILY TREATMENT NOTE 2-15    Patient Name: Corina Cm  Date:2018  : 1990  [x]  Patient  Verified  Payor: BLUE CROSS / Plan: [facility] PPO / Product Type: PPO /    In time:9:25 AM  Out time:10:10 AM  Total Treatment Time (min): 45  Visit #: 10     Treatment Area: Knee pain [M25.569]    SUBJECTIVE  Pain Level (0-10 scale): 5/10 at the right hip  Any medication changes, allergies to medications, adverse drug reactions, diagnosis change, or new procedure performed?: [x] No    [] Yes (see summary sheet for update)  Subjective functional status/changes:   [] No changes reported  Patient returns to the clinic following her [employer] race three weeks ago. The left foot pain continued through her race prep, but it is her right hip pain that caused her to DNF the race. The pain started gradually returning the week before the race following a run workout and she admittedly forgot to perform her SD exercises. Since the race, she was diagnosed by [facility] with a left 5th metatarsal stress fracture, plantar fasciitis, and peroneal tendinitis. She was placed in a boot and will have a f/u visit tomorrow to review plan of care.  The hip pain has lessened, but she remains very tender and continues to have poor compliance with SD program.    OBJECTIVE    Modality rationale: decrease inflammation, decrease pain and increase tissue extensibility to improve the patients ability to ambulate with less pain   Min Type Additional Details   15 [x] Estim: []Att   []Unatt        []TENS instruct                  []IFC  []Premod   []NMES                     [x]Other: asymmetrical, biphasic, 300 usec, 2 Hz, position: []w/US   []w/ice   []w/heat  Position: left sidelying   Location:right glut med, TFL    []  Traction: [] Cervical       []Lumbar                       [] Prone          []Supine                       []Intermittent   []Continuous Lbs:  [] before manual  [] after manual  []w/heat    [] Ultrasound: []Continuous   [] Pulsed at:                            []1MHz   []3MHz Location:  W/cm2:    []  Paraffin         Location:  []w/heat    []  Ice     []  Heat  []  Ice massage Position:  Location:    []  Laser  []  Other: Position:  Location:    []  Vasopneumatic Device Pressure:       [] lo [] med [] hi   Temperature:    [x] Skin assessment post-treatment:  [x]intact [x]redness- no adverse reaction    []redness  adverse reaction:       30 min Manual Therapy:  DN was first performed in left sidelying to musculature along the right lateral hip. Needles were first inserted along the tenosseous junction for the piriformis, superior and inferior gemellus, and quadtraus femoris, 4 needles total, inserted full depth to bony contact. DN was then performed at the iliac attachment of glut medius along the anterior portion of the muscle belly, 2 needles total.     Finally DN was performed at the TFL at the iliac attachment and mid-muscle belly. Fanning and winding of the needles was performed to elicit LTR's and allow for further tone inhibition. 50 mm x .30 mm needles were used throughout.      Rationale: decrease pain, increase tissue extensibility, correct positional vertigo and decrease trigger points  to improve the patients ability to ambulate without pain    Neuromuscular Re-education was held on today's visit due to weightbearing status at the left foot.         With   [] TE   [] TA   [] neuro   [] other: Patient Education: [x] Review HEP    [] Progressed/Changed HEP based on:   [] positioning   [] body mechanics   [] transfers   [] heat/ice application    [] other:      Other Objective/Functional Measures:   Post-D/N: Right add drop: Negative    Pain Level (0-10 scale) post treatment: 0/10    ASSESSMENT/Changes in Function:     Patient will continue to benefit from skilled PT services to modify and progress therapeutic interventions, address functional mobility deficits, address ROM deficits, address strength deficits, analyze and address soft tissue restrictions, analyze and cue movement patterns, analyze and modify body mechanics/ergonomics, assess and modify postural abnormalities, address imbalance/dizziness and instruct in home and community integration to attain remaining goals. []  See Plan of Care  []  See progress note/recertification  []  See Discharge Summary         Progress towards goals / Updated goals:  Patient tolerated today's therapy very well despite reports of increased right hip pain. Lifestyle factors, specifically pushing through pain in a race environment, likely caused the flare-up in right hip pain. She should do well with PT over the next several weeks to inhibit increased muscular activity and decrease pain at the right hip. SD program may be limited due to reduce weightbearing status on left foot. PLAN  []  Upgrade activities as tolerated     [x]  Continue plan of care  [x]  Update interventions per flow sheet       []  Discharge due to:_  [x]  Other:      Teresa Arroyo, PT, DPT, OCS, Cert.  DN     6/12/2018  2:04 PM

## 2018-07-05 ENCOUNTER — HOSPITAL ENCOUNTER (OUTPATIENT)
Dept: PHYSICAL THERAPY | Age: 28
Discharge: HOME | End: 2018-07-05
Payer: COMMERCIAL

## 2018-07-05 PROCEDURE — 97014 ELECTRIC STIMULATION THERAPY: CPT | Performed by: PHYSICAL THERAPIST

## 2018-07-05 PROCEDURE — 97140 MANUAL THERAPY 1/> REGIONS: CPT | Performed by: PHYSICAL THERAPIST

## 2018-07-05 PROCEDURE — 97035 APP MDLTY 1+ULTRASOUND EA 15: CPT | Performed by: PHYSICAL THERAPIST

## 2018-07-05 PROCEDURE — 97161 PT EVAL LOW COMPLEX 20 MIN: CPT | Performed by: PHYSICAL THERAPIST

## 2018-07-05 NOTE — ANCILLARY DISCHARGE INSTRUCTIONS
[address] [place]  Phone: [phone]  Fax: [phone]    Discharge Summary  2-15    Patient name: Kierra Fisher  : 1990  Provider#: [id number]  Referral source: Anthony Galvan MD      Medical/Treatment Diagnosis: Knee pain [M25.569]     Prior Hospitalization: see medical history     Comorbidities: See Plan of Care  Prior Level of Function:See Plan of Care  Medications: Verified on Patient Summary List    Start of Care: 2018      Onset Date:2018   Visits from Start of Care: 10     Missed Visits: 1  Reporting Period : 2018 to 2018      ASSESSMENT/SUMMARY OF CARE: Arnol Suarez progressed well through  [address] and was attaining goals prior to foot injury requiring booting and altered treatment planning. Pt will be D/C at thsi time from current POC and altered plan will be adopted.     Goal:1)Patient will demonstrate Grade IV or Grade V Hruska Adduction Lift Score to allow for functional mobility in home and community settings  Status at last note/certification:  Status at discharge: met intermittantly    Goal:2)Pt will demonstrate the ability to ambulate forward and backward achieving bilateral Acetabular Femoral Internal Rotation for pain free mobility  Status at last note/certification:  Status at discharge: met    Goal:3)Pt will demonstrate the ability to jog without subjective complaints or gait deviation  Status at last note/certification:  Status at discharge: not met    Goal:4)Pt will demonstrate independence with discharge HEP to allow for ambulation, sitting and standing without objective dysfunction  Status at last note/certification:  Status at discharge: met        RECOMMENDATIONS:  [x]Discontinue therapy: []Patient has reached or is progressing toward set goals      []Patient is non-compliant or has abdicated      []Due to lack of appreciable progress towards set goals      [x]Other; additional pathology with additional orders    Louie Devlin PT, DPT, [facility] 7/5/2018 8:55 AM

## 2018-07-05 NOTE — PROGRESS NOTES
[address] [place] [facility] [address] 58  Phone: [phone]  Fax: [phone]    Plan of Care/ Statement of Necessity for Physical Therapy Services 2-15    Patient name: Marcos Estrada  : 1990  Provider#: [id number]  Referral source: Korin Metcalf MD      Medical/Treatment Diagnosis: Right leg pain [M79.604]     Prior Hospitalization: see medical history     Comorbidities: previous musculoskeletal injuries secondary to overuse  Prior Level of Function: full duty in active profession (standing, sitting, walking), training for triathlon  Medications: Verified on Patient Summary List    Start of Care: 2018      Onset Date: 8 wk ago       The Plan of Care and following information is based on the information from the initial evaluation. Assessment/ encarnacion information: Georgia Ny presents to our office s/p boot removal for peroneal tendinosis and plantar fascitis right foot.  She has + pn to palpation, limited talocrural and tib/fib jt mobility, pain with ambulation and gait deviation and will benefit       Evaluation Complexity History LOW Complexity : Zero comorbidities / personal factors that will impact the outcome / POC; Examination MEDIUM Complexity : 3 Standardized tests and measures addressing body structure, function, activity limitation and / or participation in recreation  ;Presentation LOW Complexity : Stable, uncomplicated  ;Clinical Decision Making MEDIUM Complexity : FOTO score of 26-74  Overall Complexity Rating: LOW     Problem List: pain affecting function, decrease ROM, decrease strength, impaired gait/ balance, decrease ADL/ functional abilitiies, decrease activity tolerance, decrease flexibility/ joint mobility and decrease transfer abilities   Treatment Plan may include any combination of the following: Therapeutic exercise, Therapeutic activities, Neuromuscular re-education, Physical agent/modality, Gait/balance training, Manual therapy, Patient education and Functional mobility training  Patient / Family readiness to learn indicated by: asking questions, trying to perform skills and interest  Persons(s) to be included in education: patient (P)  Barriers to Learning/Limitations: None  Patient Goal (s): to have no pain when I walk or run  Patient Self Reported Health Status: excellent2  Rehabilitation Potential: good    Short Term Goals: To be accomplished in 2 weeks:  Pt will demo ambulation x 15 minutes with no gait deviation and no pain present  Pt will demo independence with HEP with no v.c. Needed  Pt will demo full right ankle DF and PF to allow for ambulation without deviation    Long Term Goals: To be accomplished in 6-8 weeks:  1)Patient will demonstrate Grade IV or Grade V Hruska Adduction Lift Score to allow for functional mobility in home and community settings  2)Pt will demonstrate the ability to ambulate forward and backward achieving bilateral Acetabular Femoral Internal Rotation for pain free mobility  3)Pt will demonstrate the ability to run and cycle without subjective complaints or gait deviation  4)Pt will demonstrate independence with discharge HEP to allow for ambulation, sitting and standing without objective dysfunction    Frequency / Duration: Patient to be seen 2 times per week for up to 12 treatments PRN    Patient/ Caregiver education and instruction: self care, activity modification and exercises    [x]  Plan of care has been reviewed with PTA    Dougie Armstrong PT, DPT, [facility]   7/5/2018   11:03 AM    ________________________________________________________________________    I certify that the above Therapy Services are being furnished while the patient is under my care. I agree with the treatment plan and certify that this therapy is necessary.     [de-identified] Signature:____________________  Date:____________Time: _________

## 2018-07-13 ENCOUNTER — HOSPITAL ENCOUNTER (OUTPATIENT)
Dept: PHYSICAL THERAPY | Age: 28
Discharge: HOME | End: 2018-07-13
Payer: COMMERCIAL

## 2018-07-13 PROCEDURE — 97140 MANUAL THERAPY 1/> REGIONS: CPT | Performed by: PHYSICAL THERAPIST

## 2018-07-13 PROCEDURE — 97035 APP MDLTY 1+ULTRASOUND EA 15: CPT | Performed by: PHYSICAL THERAPIST

## 2018-07-13 PROCEDURE — 97014 ELECTRIC STIMULATION THERAPY: CPT | Performed by: PHYSICAL THERAPIST

## 2018-07-13 PROCEDURE — 97110 THERAPEUTIC EXERCISES: CPT | Performed by: PHYSICAL THERAPIST

## 2018-07-20 ENCOUNTER — HOSPITAL ENCOUNTER (OUTPATIENT)
Dept: PHYSICAL THERAPY | Age: 28
Discharge: HOME | End: 2018-07-20
Payer: COMMERCIAL

## 2018-07-20 PROCEDURE — 97140 MANUAL THERAPY 1/> REGIONS: CPT | Performed by: PHYSICAL THERAPIST

## 2018-07-20 PROCEDURE — 97035 APP MDLTY 1+ULTRASOUND EA 15: CPT | Performed by: PHYSICAL THERAPIST

## 2018-07-20 PROCEDURE — 97014 ELECTRIC STIMULATION THERAPY: CPT | Performed by: PHYSICAL THERAPIST

## 2018-07-20 PROCEDURE — 97110 THERAPEUTIC EXERCISES: CPT | Performed by: PHYSICAL THERAPIST

## 2018-07-20 NOTE — PROGRESS NOTES
PT DAILY TREATMENT NOTE - [facility] 2-15    Patient Name: Yoly Hernandez  Date:2018  : 1990  [x]  Patient  Verified  Payor: BLUE CROSS / Plan: Ashok Vergara [id number] PPO / Product Type: PPO /    In time:8:05 AM  Out time:9:55  Total Treatment Time (min): 50  Total Timed Codes (min): 30  1:1 Treatment Time ( only): 30   Visit #: 3    Treatment Area: Right leg pain [M79.604]    SUBJECTIVE  Pain Level (0-10 scale):0/10  Any medication changes, allergies to medications, adverse drug reactions, diagnosis change, or new procedure performed?: [x] No    [] Yes (see summary sheet for update)  Subjective functional status/changes:   [] No changes reported  Patient ran for 45 minutes two days ago at pace of a 2 minute walk and 45 second jog. She also was able to bike for an hour at her \"ITT EXIM pace\" yesterday. Both workouts caused no increase in foot pain. She was very sore at the lower leg after her last visit due to the DN.     OBJECTIVE    Modality rationale: decrease pain and increase tissue extensibility to improve the patients ability to walk without pain   Min Type Additional Details   10 [x] Estim: []Att   []Unatt        []TENS instruct                  []IFC  []Premod   []NMES                     [x]Other: asymmetrical, biphasic, 300 usec, 2 Hz []w/US   []w/ice   []w/heat  [x] w/ needles  Position: prone  Location: right lower leg    []  Traction: [] Cervical       []Lumbar                       [] Prone          []Supine                       []Intermittent   []Continuous Lbs:  [] before manual  [] after manual  []w/heat   10 [x]  Ultrasound: []Continuous   [x] Pulsed at:50%                           []1MHz   [x]3MHz Location: right lower leg  W/cm2:1.0    [] Paraffin         Location:   []w/heat    []  Ice     []  Heat  []  Ice massage Position:  Location:    []  Laser  []  Other: Position:  Location:      []  Vasopneumatic Device Pressure:       [] lo [] med [] hi   Temperature:      [x] Skin assessment post-treatment:  [x]intact []redness- no adverse reaction    []redness  adverse reaction:     10 min Therapeutic Exercise:  [x] See flow sheet :   Rationale: increase ROM, increase strength and improve coordination to improve the patients ability to walk without pain    20 min Manual Therapy:   DN was performed along the following musculature along the right lower leg:  Peroneus longus, 3 needles total at trigger points palpated throughout the muscle belly  Peroneus brevis, 2 needles  Peroneus tertius, 1 needle  Lateral gastrocnemius, 4 needles  50 mm x .30 mm needles were used throughout with insertion onto a bony end point and pistoning to elicit LTR's    DN was performed prior to the following manual therapy techniques to allow for improved ROM and decreased pain with walking:  Distraction gd III and IV right talocrural jt; fibular glides gd III and IV right distal jt; A/P talocrural jt mobs gd III and IV   Rationale: decrease pain, increase ROM, increase tissue extensibility and decrease trigger points to improve the patients ability to walk without pain          With   [] TE   [] TA   [] neuro   [] other: Patient Education: [x] Review HEP    [] Progressed/Changed HEP based on:   [] positioning   [] body mechanics   [] transfers   [] heat/ice application    [] other:      Other Objective/Functional Measures:   Several significant LTR's were elicited with DN along the peroneal longus muscle belly and lateral gastrocnemius head    Pain Level (0-10 scale) post treatment: 0    ASSESSMENT/Changes in Function:     Patient will continue to benefit from skilled PT services to modify and progress therapeutic interventions, address functional mobility deficits, address ROM deficits, address strength deficits, analyze and address soft tissue restrictions, analyze and cue movement patterns, analyze and modify body mechanics/ergonomics and assess and modify postural abnormalities to attain remaining goals. []  See Plan of Care  []  See progress note/recertification  []  See Discharge Summary         Progress towards goals / Updated goals:  Patient continues to show excellent progress towards her goal of returning to running pain-free and tolerated today's interventions well with no increased pain reported. PLAN  [x]  Upgrade activities as tolerated     [x]  Continue plan of care  []  Update interventions per flow sheet       []  Discharge due to:_  []  Other:_      Tori Gallegos PT , DPT, OCS, Cert.  DN   7/20/2018  11:56 AM

## 2018-07-27 ENCOUNTER — HOSPITAL ENCOUNTER (OUTPATIENT)
Dept: PHYSICAL THERAPY | Age: 28
Discharge: HOME | End: 2018-07-27
Payer: COMMERCIAL

## 2018-07-27 PROCEDURE — 97014 ELECTRIC STIMULATION THERAPY: CPT | Performed by: PHYSICAL THERAPIST

## 2018-07-27 PROCEDURE — 97035 APP MDLTY 1+ULTRASOUND EA 15: CPT | Performed by: PHYSICAL THERAPIST

## 2018-07-27 PROCEDURE — 97140 MANUAL THERAPY 1/> REGIONS: CPT | Performed by: PHYSICAL THERAPIST

## 2018-07-27 NOTE — PROGRESS NOTES
PT DAILY TREATMENT NOTE - [facility] 2-15 Patient Name: Rafael Steward 
Date:2018 : 1990 [x]  Patient  Verified Payor: BLUE CROSS / Plan: [facility] PPO / Product Type: PPO / In time:8:35 AM  Out time:9:35 AM 
Total Treatment Time (min): 60 Total Timed Codes (min): 30 
1:1 Treatment Time ( only): 30 Visit #: 4 Treatment Area: Right leg pain [Y65.759] SUBJECTIVE Pain Level (0-10 scale): 0/10 Any medication changes, allergies to medications, adverse drug reactions, diagnosis change, or new procedure performed?: [x] No    [] Yes (see summary sheet for update) Subjective functional status/changes:   [] No changes reported Patient reports that she has improved 85%. She has been able to swim and cycle without symptoms when there is limited resistance on her . OBJECTIVE Modality rationale: decrease pain and increase tissue extensibility to improve the patients ability to walk without pain Min Type Additional Details 10 [x] Estim: []Att   []Unatt        []TENS instruct []IFC  [x]Premod   []NMES []Other:  []w/US   [x]w/ice   []w/heat Position:supine Location: right lower leg  
 []  Traction: [] Cervical       []Lumbar 
                     [] Prone          []Supine []Intermittent   []Continuous Lbs: 
[] before manual 
[] after manual 
[]w/heat  
10 [x]  Ultrasound: []Continuous   [x] Pulsed at:50% []1MHz   [x]3MHz Location: right lower leg W/cm2:1.0 [] Paraffin Location:  
[]w/heat  
 []  Ice     []  Heat 
[]  Ice massage Position: Location:  
 []  Laser 
[]  Other: Position: Location:  
 
 []  Vasopneumatic Device Pressure:       [] lo [] med [] hi  
Temperature:   
 
[x] Skin assessment post-treatment:  [x]intact []redness- no adverse reaction 
  []redness  adverse reaction:  
 
20 min Manual Therapy: 
Distraction gd III and IV right talocrural jt; fibular glides gd III and IV right distal jt; A/P talocrural jt mobs gd III and IV Soft tissue massage to right lateral gastroc and soleus mm belly Rationale: decrease pain, increase ROM, increase tissue extensibility and decrease trigger points to improve the patients ability to walk without pain With 
 [] TE 
 [] TA 
 [] neuro 
 [] other: Patient Education: [x] Review HEP [] Progressed/Changed HEP based on:  
[] positioning   [] body mechanics   [] transfers   [] heat/ice application   
[] other:   
 
Other Objective/Functional Measures: Hypertonicity present right lateral gastroc mm belly Pain Level (0-10 scale) post treatment: 0 
 
 
ASSESSMENT/Changes in Function:  
 
Patient will continue to benefit from skilled PT services to modify and progress therapeutic interventions, address functional mobility deficits, address ROM deficits, address strength deficits, analyze and address soft tissue restrictions, analyze and cue movement patterns, analyze and modify body mechanics/ergonomics and assess and modify postural abnormalities to attain remaining goals. []  See Plan of Care 
[]  See progress note/recertification 
[]  See Discharge Summary Progress towards goals / Updated goals: 
Patient is compliant with current HEP and is progressing well toward current goals. PLAN [x]  Upgrade activities as tolerated     [x]  Continue plan of care 
[]  Update interventions per flow sheet      
[]  Discharge due to:_ 
[]  Other:_ Adal Nieto PT , DPT, [facility]7/27/2018  11:56 AM

## 2018-08-02 ENCOUNTER — HOSPITAL ENCOUNTER (OUTPATIENT)
Dept: PHYSICAL THERAPY | Age: 28
Discharge: HOME | End: 2018-08-02
Payer: COMMERCIAL

## 2018-08-02 PROCEDURE — 97110 THERAPEUTIC EXERCISES: CPT | Performed by: PHYSICAL THERAPIST

## 2018-08-02 PROCEDURE — 97140 MANUAL THERAPY 1/> REGIONS: CPT | Performed by: PHYSICAL THERAPIST

## 2018-08-02 PROCEDURE — 97014 ELECTRIC STIMULATION THERAPY: CPT | Performed by: PHYSICAL THERAPIST

## 2018-08-02 NOTE — PROGRESS NOTES
PT DAILY TREATMENT NOTE - [facility] 2-15 Patient Name: Aneta Shi 
MZSW:3[birth year] : 1990 [x]  Patient  Verified Payor: Toney Izquierdosteve / Plan:  [address] / Product Type: HMO /   
In time:6:05 PM  Out time:6:55 PM 
Total Treatment Time (min): 50 Total Timed Codes (min): 40 
1:1 Treatment Time ( only): 40 Visit #: 1 Treatment Area: Right leg pain [K75.634] SUBJECTIVE Pain Level (0-10 scale): 2/10 Any medication changes, allergies to medications, adverse drug reactions, diagnosis change, or new procedure performed?: [x] No    [] Yes (see summary sheet for update) Subjective functional status/changes:   [] No changes reported Patient reports that this past week she has had increased lateral leg and foot pain, even with cycling. She is concerned that she is regressing from her status last week. OBJECTIVE Modality rationale: decrease pain and increase tissue extensibility to improve the patients ability to walk without pain Min Type Additional Details 10 [x] Estim: []Att   []Unatt        []TENS instruct []IFC  []Premod   []NMES [x]Other: asymmetrical, biphasic, 300 usec, 2 Hz []w/US   []w/ice   []w/heat [x] w/ needles Position: prone Location: right lower leg  
 []  Traction: [] Cervical       []Lumbar 
                     [] Prone          []Supine []Intermittent   []Continuous Lbs: 
[] before manual 
[] after manual 
[]w/heat  
 []  Ultrasound: []Continuous   [x] Pulsed at:50% []1MHz   [x]3MHz Location: right lower leg W/cm2:1.0 [] Paraffin Location:  
[]w/heat  
 []  Ice     []  Heat 
[]  Ice massage Position: Location:  
 []  Laser 
[]  Other: Position: Location:  
 
 []  Vasopneumatic Device Pressure:       [] lo [] med [] hi  
Temperature:   
 
[x] Skin assessment post-treatment:  [x]intact []redness- no adverse reaction 
  []redness  adverse reaction:  
 
10 min Therapeutic Exercise:  [x] See flow sheet :  
Rationale: increase ROM, increase strength and improve coordination to improve the patients ability to walk without pain 30 min Manual Therapy: DN was performed along the following musculature along the right lower leg: 
Peroneus longus, 3 needles total at trigger points palpated throughout the muscle belly Peroneus brevis, 2 needles Peroneus tertius, 1 needle Lateral gastrocnemius, 4 needles 50 mm x .30 mm needles were used throughout with insertion onto a bony end point and pistoning to elicit LTR's DN was performed prior to the following manual therapy techniques to allow for improved ROM and decreased pain with walking: 
Distraction gd III and IV right talocrural jt; fibular glides gd III and IV right distal jt; A/P talocrural jt mobs gd III and IV Rationale: decrease pain, increase ROM, increase tissue extensibility and decrease trigger points to improve the patients ability to walk without pain With 
 [] TE 
 [] TA 
 [] neuro 
 [] other: Patient Education: [x] Review HEP [] Progressed/Changed HEP based on:  
[] positioning   [] body mechanics   [] transfers   [] heat/ice application   
[] other:   
 
Other Objective/Functional Measures: Will perform gait analysis on next visit if patient's foot pain is improved. Pain Level (0-10 scale) post treatment: 0 
 
ASSESSMENT/Changes in Function:  
 
Patient will continue to benefit from skilled PT services to modify and progress therapeutic interventions, address functional mobility deficits, address ROM deficits, address strength deficits, analyze and address soft tissue restrictions, analyze and cue movement patterns, analyze and modify body mechanics/ergonomics and assess and modify postural abnormalities to attain remaining goals. []  See Plan of Care 
[]  See progress note/recertification 
[]  See Discharge Summary Progress towards goals / Updated goals: 
Patient tolerated today's therapy very well and will continue to utilize DN as needed to eliminate abnormal muscle tone within the posterior and lateral lower leg and allow for a return to running next week. PLAN [x]  Upgrade activities as tolerated     [x]  Continue plan of care 
[]  Update interventions per flow sheet      
[]  Discharge due to:_ 
[]  Other:_   
 
Ta Toledo, PT , DPT, OCS, Cert.  DN 
 8/2/2018  11:56 AM

## 2018-08-09 ENCOUNTER — HOSPITAL ENCOUNTER (OUTPATIENT)
Dept: PHYSICAL THERAPY | Age: 28
Discharge: HOME | End: 2018-08-09
Payer: COMMERCIAL

## 2018-08-09 PROCEDURE — 97014 ELECTRIC STIMULATION THERAPY: CPT | Performed by: PHYSICAL THERAPIST

## 2018-08-09 PROCEDURE — 97110 THERAPEUTIC EXERCISES: CPT | Performed by: PHYSICAL THERAPIST

## 2018-08-09 PROCEDURE — 97140 MANUAL THERAPY 1/> REGIONS: CPT | Performed by: PHYSICAL THERAPIST

## 2018-08-09 NOTE — PROGRESS NOTES
PT DAILY TREATMENT NOTE - [facility] 2-15    Patient Name: Nav Tarango  Date:2018  : 1990  [x]  Patient  Verified  Payor: Leatha Villarreal / Plan: Inge Jenkins / Product Type: HMO /    In time:6:05 PM  Out time:6:55 PM  Total Treatment Time (min): 50  Total Timed Codes (min): 40  1:1 Treatment Time ( only): 40   Visit #: 6    Treatment Area: Right leg pain [M79.604]    SUBJECTIVE  Pain Level (0-10 scale): 2/10  Any medication changes, allergies to medications, adverse drug reactions, diagnosis change, or new procedure performed?: [x] No    [] Yes (see summary sheet for update)  Subjective functional status/changes:   [] No changes reported  Patient reports that she can now consistently run for 30-45 minutes at a time with no increase in foot pain. She has yet to return to the her previous pace, but otherwise is running at a normal volume.     OBJECTIVE    Modality rationale: decrease pain and increase tissue extensibility to improve the patients ability to walk without pain   Min Type Additional Details   10 [x] Estim: []Att   []Unatt        []TENS instruct                  []IFC  []Premod   []NMES                     [x]Other: asymmetrical, biphasic, 300 usec, 2 Hz []w/US   []w/ice   []w/heat  [x] w/ needles  Position: prone  Location: right lower leg    []  Traction: [] Cervical       []Lumbar                       [] Prone          []Supine                       []Intermittent   []Continuous Lbs:  [] before manual  [] after manual  []w/heat    []  Ultrasound: []Continuous   [x] Pulsed at:50%                           []1MHz   [x]3MHz Location: right lower leg  W/cm2:1.0    [] Paraffin         Location:   []w/heat    []  Ice     []  Heat  []  Ice massage Position:  Location:    []  Laser  []  Other: Position:  Location:      []  Vasopneumatic Device Pressure:       [] lo [] med [] hi   Temperature:      [x] Skin assessment post-treatment:  [x]intact []redness- no adverse reaction    []redness  adverse reaction:     30 min Manual Therapy:   DN was performed along the following musculature along the right lower leg:  Peroneus longus, 3 needles total at trigger points palpated throughout the muscle belly  Peroneus brevis, 2 needles  Peroneus tertius, 1 needle  Lateral gastrocnemius, 4 needles  50 mm x .30 mm needles were used throughout with insertion onto a bony end point and pistoning to elicit LTR's    DN was performed prior to the following manual therapy techniques to allow for improved ROM and decreased pain with walking:  Distraction gd III and IV right talocrural jt; fibular glides gd III and IV right distal jt; A/P talocrural jt mobs gd III and IV   Rationale: decrease pain, increase ROM, increase tissue extensibility and decrease trigger points to improve the patients ability to walk without pain          With   [] TE   [] TA   [] neuro   [] other: Patient Education: [x] Review HEP    [] Progressed/Changed HEP based on:   [] positioning   [] body mechanics   [] transfers   [] heat/ice application    [] other:      Other Objective/Functional Measures:   Gait analysis was performed on treadmill, running at 9:30/mi pace for 10 minutes    Pain Level (0-10 scale) post treatment: 0    ASSESSMENT/Changes in Function:     Patient will continue to benefit from skilled PT services to modify and progress therapeutic interventions, address functional mobility deficits, address ROM deficits, address strength deficits, analyze and address soft tissue restrictions, analyze and cue movement patterns, analyze and modify body mechanics/ergonomics and assess and modify postural abnormalities to attain remaining goals. []  See Plan of Care  []  See progress note/recertification  []  See Discharge Summary         Progress towards goals / Updated goals:  Patient continues to do very well with PT and is improving run volume with each week.  Analysis of running video will be done over the next week and a written report will be provided at the next visit. PLAN  [x]  Upgrade activities as tolerated     [x]  Continue plan of care  []  Update interventions per flow sheet       []  Discharge due to:_  []  Other:_      Georgina Lam, PT , DPT, OCS, Cert.  DN   8/9/2018  11:56 AM

## 2018-08-21 ENCOUNTER — HOSPITAL ENCOUNTER (OUTPATIENT)
Dept: PHYSICAL THERAPY | Age: 28
Discharge: HOME | End: 2018-08-21
Payer: COMMERCIAL

## 2018-08-21 PROCEDURE — 97014 ELECTRIC STIMULATION THERAPY: CPT | Performed by: PHYSICAL THERAPIST

## 2018-08-21 PROCEDURE — 97140 MANUAL THERAPY 1/> REGIONS: CPT | Performed by: PHYSICAL THERAPIST

## 2018-08-21 NOTE — PROGRESS NOTES
PT DAILY TREATMENT NOTE - [facility] 2-15    Patient Name: Pascual Kaba  Date:2018  : 1990  [x]  Patient  Verified  Payor: Pete Nail / Plan: Brianna Lopez / Product Type: HMO /    In time:6:05 PM  Out time:6:45 PM  Total Treatment Time (min): 40  Total Timed Codes (min): 30  1:1 Treatment Time ( only): 30   Visit #: 7    Treatment Area: Right leg pain [M79.604]    SUBJECTIVE  Pain Level (0-10 scale): 2/10  Any medication changes, allergies to medications, adverse drug reactions, diagnosis change, or new procedure performed?: [x] No    [] Yes (see summary sheet for update)  Subjective functional status/changes:   [] No changes reported  Patient reports some increased soreness from this mornings run. She has not changed any parameters in her run over the past several weeks. She continues to run for 30-45 min at a time at about a 9:3/mi0 pace.     OBJECTIVE    Modality rationale: decrease pain and increase tissue extensibility to improve the patients ability to walk without pain   Min Type Additional Details   10 [x] Estim: []Att   []Unatt        []TENS instruct                  []IFC  []Premod   []NMES                     [x]Other: asymmetrical, biphasic, 300 usec, 2 Hz []w/US   []w/ice   []w/heat  [x] w/ needles  Position: prone  Location: right lower leg    []  Traction: [] Cervical       []Lumbar                       [] Prone          []Supine                       []Intermittent   []Continuous Lbs:  [] before manual  [] after manual  []w/heat    []  Ultrasound: []Continuous   [x] Pulsed at:50%                           []1MHz   [x]3MHz Location: right lower leg  W/cm2:1.0    [] Paraffin         Location:   []w/heat    []  Ice     []  Heat  []  Ice massage Position:  Location:    []  Laser  []  Other: Position:  Location:      []  Vasopneumatic Device Pressure:       [] lo [] med [] hi   Temperature:      [x] Skin assessment post-treatment:  [x]intact []redness- no adverse reaction    []redness  adverse reaction:     30 min Manual Therapy:   DN was performed along the following musculature along the right lower leg:  Peroneus longus, 3 needles total at trigger points palpated throughout the muscle belly  Peroneus brevis, 2 needles  Peroneus tertius, 1 needle  Lateral gastrocnemius, 4 needles  50 mm x .30 mm needles were used throughout with insertion onto a bony end point and pistoning to elicit LTR's    DN was performed prior to the following manual therapy techniques to allow for improved ROM and decreased pain with walking:  Distraction gd III and IV right talocrural jt; fibular glides gd III and IV right distal jt; A/P talocrural jt mobs gd III and IV   Rationale: decrease pain, increase ROM, increase tissue extensibility and decrease trigger points to improve the patients ability to walk without pain          With   [] TE   [] TA   [] neuro   [] other: Patient Education: [x] Review HEP    [] Progressed/Changed HEP based on:   [] positioning   [] body mechanics   [] transfers   [] heat/ice application    [] other:      Other Objective/Functional Measures:   Gait analysis from last visit:   Sagittal plane  No particular asymmetries found. Mid foot strike, normal hip and knee angles. Very slight forward trunk lean.   Frontal plane                  Knee varus/valgus: Right knee demonstrates 5 degrees greater valgus moment compared to left                  Subtalar pronation: No issues, equal at initial stance, mid stance, and terminal stance                  Right foot is held in 5 degrees greater external rotation (toe out) relative to left                  The arm swing is asymmetrical with the right UE driving straight back while the left UE is held in a greater degree of shoulder abduction    Pain Level (0-10 scale) post treatment: 0    ASSESSMENT/Changes in Function:     Patient will continue to benefit from skilled PT services to modify and progress therapeutic interventions, address functional mobility deficits, address ROM deficits, address strength deficits, analyze and address soft tissue restrictions, analyze and cue movement patterns, analyze and modify body mechanics/ergonomics and assess and modify postural abnormalities to attain remaining goals. []  See Plan of Care  []  See progress note/recertification  []  See Discharge Summary         Progress towards goals / Updated goals:  Patient tolerated today's therapy well and the patient will look to increase both pace and volume with her running next week. PLAN  [x]  Upgrade activities as tolerated     [x]  Continue plan of care  []  Update interventions per flow sheet       []  Discharge due to:_  []  Other:_      Fernando Chapa, PT , DPT, OCS, Cert.  DN   8/21/2018  11:56 AM

## 2018-08-30 ENCOUNTER — HOSPITAL ENCOUNTER (OUTPATIENT)
Dept: PHYSICAL THERAPY | Age: 28
Discharge: HOME | End: 2018-08-30
Payer: COMMERCIAL

## 2018-08-30 PROCEDURE — 97140 MANUAL THERAPY 1/> REGIONS: CPT | Performed by: PHYSICAL THERAPIST

## 2018-08-30 PROCEDURE — 97014 ELECTRIC STIMULATION THERAPY: CPT | Performed by: PHYSICAL THERAPIST

## 2018-08-30 NOTE — PROGRESS NOTES
PT DAILY TREATMENT NOTE - [facility] 2-15    Patient Name: Pascual Kaba  Date:2018  : 1990  [x]  Patient  Verified  Payor: Pete Nail / Plan: Brianna Lopez / Product Type: HMO /    In time:4:30 PM  Out time:5:10 PM  Total Treatment Time (min): 40  Total Timed Codes (min): 30  1:1 Treatment Time ([address] only): 40  Visit #: 8    Treatment Area: Right leg pain [M79.604]    SUBJECTIVE  Pain Level (0-10 scale): 0/10  Any medication changes, allergies to medications, adverse drug reactions, diagnosis change, or new procedure performed?: [x] No    [] Yes (see summary sheet for update)  Subjective functional status/changes:   [] No changes reported  Patient reports that she has had no pain over the past week and is very happy with her progress. She will begin cycling for longer durations with hills this weekend.     OBJECTIVE    Modality rationale: decrease pain and increase tissue extensibility to improve the patients ability to walk without pain   Min Type Additional Details   10 [x] Estim: []Att   []Unatt        []TENS instruct                  []IFC  []Premod   []NMES                     [x]Other: asymmetrical, biphasic, 300 usec, 2 Hz []w/US   []w/ice   []w/heat  [x] w/ needles  Position: prone  Location: right lower leg    []  Traction: [] Cervical       []Lumbar                       [] Prone          []Supine                       []Intermittent   []Continuous Lbs:  [] before manual  [] after manual  []w/heat    []  Ultrasound: []Continuous   [x] Pulsed at:50%                           []1MHz   [x]3MHz Location: right lower leg  W/cm2:1.0    [] Paraffin         Location:   []w/heat    []  Ice     []  Heat  []  Ice massage Position:  Location:    []  Laser  []  Other: Position:  Location:      []  Vasopneumatic Device Pressure:       [] lo [] med [] hi   Temperature:      [x] Skin assessment post-treatment:  [x]intact []redness- no adverse reaction    []redness  adverse reaction: 30 min Manual Therapy:   DN was performed along the following musculature along the right lower leg:  Peroneus longus, 3 needles total at trigger points palpated throughout the muscle belly  Peroneus brevis, 2 needles  Peroneus tertius, 1 needle  Lateral gastrocnemius, 4 needles  50 mm x .30 mm needles were used throughout with insertion onto a bony end point and pistoning to elicit LTR's    DN was performed prior to the following manual therapy techniques to allow for improved ROM and decreased pain with walking:  Distraction gd III and IV right talocrural jt; fibular glides gd III and IV right distal jt; A/P talocrural jt mobs gd III and IV   Rationale: decrease pain, increase ROM, increase tissue extensibility and decrease trigger points to improve the patients ability to walk without pain          With   [] TE   [] TA   [] neuro   [] other: Patient Education: [x] Review HEP    [] Progressed/Changed HEP based on:   [] positioning   [] body mechanics   [] transfers   [] heat/ice application    [] other:      Other Objective/Functional Measures:     Pain Level (0-10 scale) post treatment: 0    ASSESSMENT/Changes in Function:     Patient will continue to benefit from skilled PT services to modify and progress therapeutic interventions, address functional mobility deficits, address ROM deficits, address strength deficits, analyze and address soft tissue restrictions, analyze and cue movement patterns, analyze and modify body mechanics/ergonomics and assess and modify postural abnormalities to attain remaining goals. []  See Plan of Care  []  See progress note/recertification  []  See Discharge Summary         Progress towards goals / Updated goals:  Long Term Goals:  To be accomplished in 6-8 weeks:  1)Patient will demonstrate Grade IV or Grade V Hruska Adduction Lift Score to allow for functional mobility in home and community settingsProgressing towards goal.  2)Pt will demonstrate the ability to ambulate forward and backward achieving bilateral Acetabular Femoral Internal Rotation for pain free mobilityProgressing towards goal.  3)Pt will demonstrate the ability to run and cycle without subjective complaints or gait deviationProgressing towards goal.  4)Pt will demonstrate independence with discharge HEP to allow for ambulation, sitting and standing without objective dysfunction Progressing towards goal.    PLAN  [x]  Upgrade activities as tolerated     [x]  Continue plan of care  []  Update interventions per flow sheet       []  Discharge due to:_  []  Other:_      Rudy Duncan PT , DPT, OCS, Cert.  DN   8/30/2018  11:56 AM

## 2018-09-06 ENCOUNTER — APPOINTMENT (OUTPATIENT)
Dept: PHYSICAL THERAPY | Age: 28
End: 2018-09-06

## 2018-09-18 ENCOUNTER — APPOINTMENT (OUTPATIENT)
Dept: PHYSICAL THERAPY | Age: 28
End: 2018-09-18

## 2018-10-04 ENCOUNTER — APPOINTMENT (OUTPATIENT)
Dept: PHYSICAL THERAPY | Age: 28
End: 2018-10-04
Payer: COMMERCIAL

## 2018-10-11 ENCOUNTER — HOSPITAL ENCOUNTER (OUTPATIENT)
Dept: PHYSICAL THERAPY | Age: 28
Discharge: HOME | End: 2018-10-11
Payer: COMMERCIAL

## 2018-10-11 PROCEDURE — 97112 NEUROMUSCULAR REEDUCATION: CPT | Performed by: PHYSICAL THERAPIST

## 2018-10-11 PROCEDURE — 97014 ELECTRIC STIMULATION THERAPY: CPT | Performed by: PHYSICAL THERAPIST

## 2018-10-11 PROCEDURE — 97140 MANUAL THERAPY 1/> REGIONS: CPT | Performed by: PHYSICAL THERAPIST

## 2018-10-11 NOTE — PROGRESS NOTES
PT DAILY TREATMENT NOTE - [facility] 2-15    Patient Name: Maribel Martines  Date:10/11/2018  : 1990  [x]  Patient  Verified  Payor: Nellie Paredes / Plan: Crissy Murray / Product Type: HMO /    In time:4:30 PM  Out time:5:20 PM  Total Treatment Time (min): 50  Total Timed Codes (min): 40  1:1 Treatment Time ( [address] only): 40  Visit #: 9    Treatment Area: Right leg pain [M79.604]    SUBJECTIVE  Pain Level (0-10 scale): 2/10  Any medication changes, allergies to medications, adverse drug reactions, diagnosis change, or new procedure performed?: [x] No    [] Yes (see summary sheet for update)  Subjective functional status/changes:   [] No changes reported  Patient reports that she is now running 4-6 miles with no pain and her most recent long run was 6 miles x 2 with a bike in between for a total of 12 miles. She is very happy with this progress, but continues to have increased foot pain after the long runs. She is apprehensive about stopping PT at this moment due to increased risk of re-injury with her current training plan.     OBJECTIVE    Modality rationale: decrease pain and increase tissue extensibility to improve the patients ability to walk without pain   Min Type Additional Details   10 [x] Estim: []Att   []Unatt        []TENS instruct                  []IFC  []Premod   []NMES                     [x]Other: asymmetrical, biphasic, 300 usec, 2 Hz []w/US   []w/ice   []w/heat  [x] w/ needles  Position: prone  Location: right lower leg    []  Traction: [] Cervical       []Lumbar                       [] Prone          []Supine                       []Intermittent   []Continuous Lbs:  [] before manual  [] after manual  []w/heat    []  Ultrasound: []Continuous   [x] Pulsed at:50%                           []1MHz   [x]3MHz Location: right lower leg  W/cm2:1.0    [] Paraffin         Location:   []w/heat    []  Ice     []  Heat  []  Ice massage Position:  Location:    []  Laser  []  Other: Position:  Location:      []  Vasopneumatic Device Pressure:       [] lo [] med [] hi   Temperature:      [x] Skin assessment post-treatment:  [x]intact []redness- no adverse reaction    []redness  adverse reaction:       []redness  adverse reaction:      10 min Therapeutic Exercise:  [x] See flow sheet :   Rationale: increase ROM, increase strength and improve coordination to improve the patients ability to walk without pain    30 min Manual Therapy:   DN was performed along the following musculature along the right lower leg:  Peroneus longus, 3 needles total at trigger points palpated throughout the muscle belly  Peroneus brevis, 2 needles  Peroneus tertius, 1 needle  Lateral gastrocnemius, 4 needles  50 mm x .30 mm needles were used throughout with insertion onto a bony end point and pistoning to elicit LTR's    DN was performed prior to the following manual therapy techniques to allow for improved ROM and decreased pain with walking:  Distraction gd III and IV right talocrural jt; fibular glides gd III and IV right distal jt; A/P talocrural jt mobs gd III and IV   Rationale: decrease pain, increase ROM, increase tissue extensibility and decrease trigger points to improve the patients ability to walk without pain          With   [] TE   [] TA   [] neuro   [] other: Patient Education: [x] Review HEP    [] Progressed/Changed HEP based on:   [] positioning   [] body mechanics   [] transfers   [] heat/ice application    [] other:      Other Objective/Functional Measures:     Pain Level (0-10 scale) post treatment: 0    ASSESSMENT/Changes in Function:     Patient will continue to benefit from skilled PT services to modify and progress therapeutic interventions, address functional mobility deficits, address ROM deficits, address strength deficits, analyze and address soft tissue restrictions, analyze and cue movement patterns, analyze and modify body mechanics/ergonomics and assess and modify postural abnormalities to attain remaining goals. []  See Plan of Care  []  See progress note/recertification  []  See Discharge Summary         Progress towards goals / Updated goals:  Patient continues to make excellent strides as she returns to prior level of function and completes her [employer] training program. Will look to discharge within the next several visits. PLAN  [x]  Upgrade activities as tolerated     [x]  Continue plan of care  []  Update interventions per flow sheet       []  Discharge due to:_  []  Other:_      Adry Villagomez PT , DPT, OCS, Cert.  DN   10/11/2018  11:56 AM

## 2018-10-18 ENCOUNTER — HOSPITAL ENCOUNTER (OUTPATIENT)
Dept: PHYSICAL THERAPY | Age: 28
Discharge: HOME | End: 2018-10-18
Payer: COMMERCIAL

## 2018-10-18 PROCEDURE — 97140 MANUAL THERAPY 1/> REGIONS: CPT | Performed by: PHYSICAL THERAPIST

## 2018-10-18 PROCEDURE — 97014 ELECTRIC STIMULATION THERAPY: CPT | Performed by: PHYSICAL THERAPIST

## 2018-10-18 PROCEDURE — 97110 THERAPEUTIC EXERCISES: CPT | Performed by: PHYSICAL THERAPIST

## 2018-10-18 NOTE — PROGRESS NOTES
PT DAILY TREATMENT NOTE - [facility] 2-15 Patient Name: Dangelo Rosas 
Date:10/18/2018 : 1990 [x]  Patient  Verified Payor: Jared Nolasco / Plan: [employer] / Product Type: HMO /   
In time:4:20 PM  Out time:5:10 PM 
Total Treatment Time (min): 50 Total Timed Codes (min): 40 
1:1 Treatment Time ( only): 40 Visit #: 22 Treatment Area: Right leg pain [C12.047] SUBJECTIVE Pain Level (0-10 scale): 0/10 Any medication changes, allergies to medications, adverse drug reactions, diagnosis change, or new procedure performed?: [x] No    [] Yes (see summary sheet for update) Subjective functional status/changes:   [] No changes reported Patient reports that she has been sore enough after her runs that she has to ice her ankle, but otherwise no significant increase in pain. OBJECTIVE Modality rationale: decrease pain and increase tissue extensibility to improve the patients ability to walk without pain Min Type Additional Details 10 [x] Estim: []Att   []Unatt        []TENS instruct []IFC  []Premod   []NMES [x]Other: asymmetrical, biphasic, 300 usec, 2 Hz []w/US   []w/ice   []w/heat [x] w/ needles Position: prone Location: right lower leg  
 []  Traction: [] Cervical       []Lumbar 
                     [] Prone          []Supine []Intermittent   []Continuous Lbs: 
[] before manual 
[] after manual 
[]w/heat  
 []  Ultrasound: []Continuous   [x] Pulsed at:50% []1MHz   [x]3MHz Location: right lower leg W/cm2:1.0 [] Paraffin Location:  
[]w/heat  
 []  Ice     []  Heat 
[]  Ice massage Position: Location:  
 []  Laser 
[]  Other: Position: Location:  
 
 []  Vasopneumatic Device Pressure:       [] lo [] med [] hi  
Temperature:   
 
[x] Skin assessment post-treatment:  [x]intact []redness- no adverse reaction 
  []redness  adverse reaction: []redness  adverse reaction:  
  
10 min Therapeutic Exercise:  [x] See flow sheet :  
Rationale: increase ROM, increase strength and improve coordination to improve the patients ability to walk without pain 30 min Manual Therapy: DN was performed along the following musculature along the right lower leg: 
Peroneus longus, 3 needles total at trigger points palpated throughout the muscle belly Peroneus brevis, 2 needles Peroneus tertius, 1 needle Lateral gastrocnemius, 4 needles 50 mm x .30 mm needles were used throughout with insertion onto a bony end point and pistoning to elicit LTR's DN was performed prior to the following manual therapy techniques to allow for improved ROM and decreased pain with walking: 
Distraction gd III and IV right talocrural jt; fibular glides gd III and IV right distal jt; A/P talocrural jt mobs gd III and IV Rationale: decrease pain, increase ROM, increase tissue extensibility and decrease trigger points to improve the patients ability to walk without pain With 
 [] TE 
 [] TA 
 [] neuro 
 [] other: Patient Education: [x] Review HEP [] Progressed/Changed HEP based on:  
[] positioning   [] body mechanics   [] transfers   [] heat/ice application   
[] other:   
 
Other Objective/Functional Measures:  
 
Pain Level (0-10 scale) post treatment: 0 
 
ASSESSMENT/Changes in Function:  
 
Patient will continue to benefit from skilled PT services to modify and progress therapeutic interventions, address functional mobility deficits, address ROM deficits, address strength deficits, analyze and address soft tissue restrictions, analyze and cue movement patterns, analyze and modify body mechanics/ergonomics and assess and modify postural abnormalities to attain remaining goals. []  See Plan of Care 
[]  See progress note/recertification 
[]  See Discharge Summary Progress towards goals / Updated goals: Patient tolerated today's therapy well and she will continue to focus on a gradual ramp up in her running mileage over the next several weeks to prepare for discharge. PLAN [x]  Upgrade activities as tolerated     [x]  Continue plan of care 
[]  Update interventions per flow sheet      
[]  Discharge due to:_ 
[]  Other:_   
 
Deanna Salazar PT , DPT, OCS, Cert.  DN 
 10/18/2018  11:56 AM

## 2018-10-25 ENCOUNTER — APPOINTMENT (OUTPATIENT)
Dept: PHYSICAL THERAPY | Age: 28
End: 2018-10-25
Payer: COMMERCIAL

## 2018-11-01 ENCOUNTER — HOSPITAL ENCOUNTER (OUTPATIENT)
Dept: PHYSICAL THERAPY | Age: 28
Discharge: HOME | End: 2018-11-01
Payer: COMMERCIAL

## 2018-11-01 PROCEDURE — 97112 NEUROMUSCULAR REEDUCATION: CPT | Performed by: PHYSICAL THERAPIST

## 2018-11-01 PROCEDURE — 97140 MANUAL THERAPY 1/> REGIONS: CPT | Performed by: PHYSICAL THERAPIST

## 2018-11-01 PROCEDURE — 97014 ELECTRIC STIMULATION THERAPY: CPT | Performed by: PHYSICAL THERAPIST

## 2018-11-01 NOTE — PROGRESS NOTES
PT DAILY TREATMENT NOTE - [facility] 2-15 Patient Name: Khalida Moffett 
Date:2018 : 1990 [x]  Patient  Verified Payor: Cruz Olivera / Plan: [employer] / Product Type: HMO /   
In time:4:10 PM  Out time:5:00 PM 
Total Treatment Time (min): 50 Total Timed Codes (min): 40 
1:1 Treatment Time ( only): 40 Visit #: 51 Treatment Area: Right leg pain [O09.479] SUBJECTIVE Pain Level (0-10 scale): 1/10 Any medication changes, allergies to medications, adverse drug reactions, diagnosis change, or new procedure performed?: [x] No    [] Yes (see summary sheet for update) Subjective functional status/changes:   [] No changes reported Patient reports that her mileage has increased to 15-20 miles/week and this has caused an increase in lateral ankle and peroneal musculature pain. This is still a significant improvement to even a month ago and she is very happy with her progress. OBJECTIVE Modality rationale: decrease pain and increase tissue extensibility to improve the patients ability to walk without pain Type Additional Details [x] Estim: []Att   []Unatt        []TENS instruct []IFC  []Premod   []NMES [x]Other: asymmetrical, biphasic, 300 usec, 2 Hz []w/US   []w/ice   []w/heat [x] w/ needles Position: prone Location: right lower leg  
[]  Traction: [] Cervical       []Lumbar 
                     [] Prone          []Supine []Intermittent   []Continuous Lbs: 
[] before manual 
[] after manual 
[]w/heat  
[]  Ultrasound: []Continuous   [x] Pulsed at:50% []1MHz   [x]3MHz Location: right lower leg W/cm2:1.0 [] Paraffin Location:  
[]w/heat  
[]  Ice     []  Heat 
[]  Ice massage Position: Location:  
[]  Laser 
[]  Other: Position: Location:  
[]  Vasopneumatic Device Pressure:       [] lo [] med [] hi  
Temperature: [x] Skin assessment post-treatment:  [x]intact []redness- no adverse reaction 
  []redness  adverse reaction:  
 
  []redness  adverse reaction:  
  
10 min Neuromuscular Re-education:  [x] See flow sheet :  
Rationale: increase ROM, increase strength and improve coordination to improve the patients ability to walk without pain 30 min Manual Therapy: DN was performed along the following musculature along the right lower leg: 
Peroneus longus, 3 needles total at trigger points palpated throughout the muscle belly Peroneus brevis, 2 needles Peroneus tertius, 1 needle Lateral gastrocnemius, 4 needles 50 mm x .30 mm needles were used throughout with insertion onto a bony end point and pistoning to elicit LTR's DN was performed prior to the following manual therapy techniques to allow for improved ROM and decreased pain with walking: 
Distraction gd III and IV right talocrural jt; fibular glides gd III and IV right distal jt; A/P talocrural jt mobs gd III and IV Rationale: decrease pain, increase ROM, increase tissue extensibility and decrease trigger points to improve the patients ability to walk without pain With 
 [] TE 
 [] TA 
 [] neuro 
 [] other: Patient Education: [x] Review HEP [] Progressed/Changed HEP based on:  
[] positioning   [] body mechanics   [] transfers   [] heat/ice application   
[] other:   
 
Other Objective/Functional Measures:  
 
Pain Level (0-10 scale) post treatment: 0 
 
ASSESSMENT/Changes in Function:  
 
Patient will continue to benefit from skilled PT services to modify and progress therapeutic interventions, address functional mobility deficits, address ROM deficits, address strength deficits, analyze and address soft tissue restrictions, analyze and cue movement patterns, analyze and modify body mechanics/ergonomics and assess and modify postural abnormalities to attain remaining goals. []  See Plan of Care 
[]  See progress note/recertification []  See Discharge Summary Progress towards goals / Updated goals: 
Patient tolerated today's therapy re-introduction of SD neuro-red exercises to allow for decreased tone through the lateral gastrocnemius and peroneal musculature. Will continue to utilize as needed at the patient progress towards discharge. PLAN [x]  Upgrade activities as tolerated     [x]  Continue plan of care 
[]  Update interventions per flow sheet      
[]  Discharge due to:_ 
[]  Other:_   
 
Evelio Richter PT , DPT, OCS, Cert.  DN 
 11/1/2018  11:56 AM

## 2018-11-12 ENCOUNTER — APPOINTMENT (OUTPATIENT)
Dept: PHYSICAL THERAPY | Age: 28
End: 2018-11-12
Payer: COMMERCIAL

## 2018-11-13 ENCOUNTER — HOSPITAL ENCOUNTER (OUTPATIENT)
Dept: PHYSICAL THERAPY | Age: 28
Discharge: HOME | End: 2018-11-13
Payer: COMMERCIAL

## 2018-11-13 PROCEDURE — 97014 ELECTRIC STIMULATION THERAPY: CPT | Performed by: PHYSICAL THERAPIST

## 2018-11-13 PROCEDURE — 97140 MANUAL THERAPY 1/> REGIONS: CPT | Performed by: PHYSICAL THERAPIST

## 2018-11-13 NOTE — PROGRESS NOTES
PT DAILY TREATMENT NOTE - [facility] 2-15 Patient Name: Adrián Tolliver 
Date:2018 : 1990 [x]  Patient  Verified Payor: Arturo Neil / Plan: [employer] / Product Type: HMO /   
In time:5:50 PM  Out time:6:40 PM 
Total Treatment Time (min): 50 Total Timed Codes (min): 40 
1:1 Treatment Time ( only): 40 Visit #: 85 Treatment Area: Pain in right leg [M79.779] SUBJECTIVE Pain Level (0-10 scale): \"sore\" Any medication changes, allergies to medications, adverse drug reactions, diagnosis change, or new procedure performed?: [x] No    [] Yes (see summary sheet for update) Subjective functional status/changes:   [] No changes reported Patient ran the half marathon last week and did very well. She began to have foot pain at mile 10 and decided to slow down which helped. Both of her calves also began to cramp, R>L, but she attributes this more to a lack of running than to injury. Overall, she is very happy with the progress she has made through this rehab and is ready to begin progressing her weekly mileage. OBJECTIVE Modality rationale: decrease pain and increase tissue extensibility to improve the patients ability to walk without pain Type Additional Details [x] Estim: []Att   []Unatt        []TENS instruct []IFC  []Premod   []NMES [x]Other: asymmetrical, biphasic, 300 usec, 2 Hz []w/US   []w/ice   []w/heat [x] w/ needles Position: prone Location: right lower leg  
[]  Traction: [] Cervical       []Lumbar 
                     [] Prone          []Supine []Intermittent   []Continuous Lbs: 
[] before manual 
[] after manual 
[]w/heat  
[]  Ultrasound: []Continuous   [x] Pulsed at:50% []1MHz   [x]3MHz Location: right lower leg W/cm2:1.0 [] Paraffin Location:  
[]w/heat  
[]  Ice     []  Heat 
[]  Ice massage Position: Location:  
[]  Laser 
[]  Other: Position: Location:  
[]  Vasopneumatic Device Pressure:       [] lo [] med [] hi  
Temperature:   
 
[x] Skin assessment post-treatment:  [x]intact []redness- no adverse reaction 
  []redness  adverse reaction:  
 
  []redness  adverse reaction:  
  
10 min Neuromuscular Re-education:  [x] See flow sheet :  
Rationale: increase ROM, increase strength and improve coordination to improve the patients ability to walk without pain 30 min Manual Therapy: DN was performed along the following musculature along the right lower leg: 
Peroneus longus, 3 needles total at trigger points palpated throughout the muscle belly Peroneus brevis, 2 needles Peroneus tertius, 1 needle Lateral gastrocnemius, 4 needles 50 mm x .30 mm needles were used throughout with insertion onto a bony end point and pistoning to elicit LTR's DN was performed prior to the following manual therapy techniques to allow for improved ROM and decreased pain with walking: 
Distraction gd III and IV right talocrural jt; fibular glides gd III and IV right distal jt; A/P talocrural jt mobs gd III and IV Rationale: decrease pain, increase ROM, increase tissue extensibility and decrease trigger points to improve the patients ability to walk without pain With 
 [] TE 
 [] TA 
 [] neuro 
 [] other: Patient Education: [x] Review HEP [] Progressed/Changed HEP based on:  
[] positioning   [] body mechanics   [] transfers   [] heat/ice application   
[] other:   
 
Other Objective/Functional Measures:  
 
Pain Level (0-10 scale) post treatment: 0 
 
ASSESSMENT/Changes in Function:  
 
Patient will continue to benefit from skilled PT services to modify and progress therapeutic interventions, address functional mobility deficits, address ROM deficits, address strength deficits, analyze and address soft tissue restrictions, analyze and cue movement patterns, analyze and modify body mechanics/ergonomics and assess and modify postural abnormalities to attain remaining goals. []  See Plan of Care 
[]  See progress note/recertification 
[]  See Discharge Summary Progress towards goals / Updated goals: 
Patient tolerated today's therapy very well and is showing excellent progress towards achieving all long term goals and eventual discharge. PLAN [x]  Upgrade activities as tolerated     [x]  Continue plan of care 
[]  Update interventions per flow sheet      
[]  Discharge due to:_ 
[]  Other:_   
 
Dilan Fong PT , DPT, OCS, Cert.  DN 
 11/13/2018  11:56 AM

## 2018-11-26 ENCOUNTER — APPOINTMENT (OUTPATIENT)
Dept: PHYSICAL THERAPY | Age: 28
End: 2018-11-26
Payer: COMMERCIAL

## 2018-11-27 ENCOUNTER — APPOINTMENT (OUTPATIENT)
Dept: PHYSICAL THERAPY | Age: 28
End: 2018-11-27
Payer: COMMERCIAL

## 2018-12-06 ENCOUNTER — APPOINTMENT (OUTPATIENT)
Dept: PHYSICAL THERAPY | Age: 28
End: 2018-12-06
Payer: COMMERCIAL

## 2018-12-13 ENCOUNTER — HOSPITAL ENCOUNTER (OUTPATIENT)
Dept: PHYSICAL THERAPY | Age: 28
Discharge: HOME | End: 2018-12-13
Payer: COMMERCIAL

## 2018-12-13 PROCEDURE — 97140 MANUAL THERAPY 1/> REGIONS: CPT | Performed by: PHYSICAL THERAPIST

## 2018-12-13 PROCEDURE — 97014 ELECTRIC STIMULATION THERAPY: CPT | Performed by: PHYSICAL THERAPIST

## 2018-12-13 NOTE — PROGRESS NOTES
PT DAILY TREATMENT NOTE - [facility] 2-15    Patient Name: Pardeep Valdivia  Date:2018  : 1990  [x]  Patient  Verified  Payor: Theresa Hammonds / Plan: Nancie Rossikatiedeann 77 PPO / Product Type: Commerical /    In time:5:20 PM  Out time:6:10 PM  Total Treatment Time (min): 50  Total Timed Codes (min): 40  1:1 Treatment Time ( only): 40  Visit #: 13    Treatment Area: Pain in right leg [M79.604]    SUBJECTIVE  Pain Level (0-10 scale): 2/10  Any medication changes, allergies to medications, adverse drug reactions, diagnosis change, or new procedure performed?: [x] No    [] Yes (see summary sheet for update)  Subjective functional status/changes:   [] No changes reported  Patient reports that she has had an onset of medial tendon pain along the ankle and believes it is related to consciously avoiding the outside of that ankle.     OBJECTIVE    Modality rationale: decrease pain and increase tissue extensibility to improve the patients ability to walk without pain   Type Additional Details   [x] Estim: []Att   []Unatt        []TENS instruct                  []IFC  []Premod   []NMES                     [x]Other: asymmetrical, biphasic, 300 usec, 2 Hz []w/US   []w/ice   []w/heat  [x] w/ needles  Position: prone  Location: right lower leg   []  Traction: [] Cervical       []Lumbar                       [] Prone          []Supine                       []Intermittent   []Continuous Lbs:  [] before manual  [] after manual  []w/heat   []  Ultrasound: []Continuous   [x] Pulsed at:50%                           []1MHz   [x]3MHz Location: right lower leg  W/cm2:1.0   [] Paraffin         Location:   []w/heat   []  Ice     []  Heat  []  Ice massage Position:  Location:   []  Laser  []  Other: Position:  Location:   []  Vasopneumatic Device Pressure:       [] lo [] med [] hi   Temperature:      [x] Skin assessment post-treatment:  [x]intact []redness- no adverse reaction    []redness  adverse reaction: []redness  adverse reaction:      10 min Neuromuscular Re-education:  [x] See flow sheet :   Rationale: increase ROM, increase strength and improve coordination to improve the patients ability to walk without pain    30 min Manual Therapy:   DN was performed along the following musculature along the right lower leg:  Peroneus longus, 3 needles total at trigger points palpated throughout the muscle belly  Peroneus brevis, 2 needles  Peroneus tertius, 1 needle  Lateral gastrocnemius, 4 needles  Posterior tibialis, 2 needles  50 mm x .30 mm needles were used throughout with insertion onto a bony end point and pistoning to elicit LTR's    DN was performed prior to the following manual therapy techniques to allow for improved ROM and decreased pain with walking:  Distraction gd III and IV right talocrural jt; fibular glides gd III and IV right distal jt; A/P talocrural jt mobs gd III and IV   Rationale: decrease pain, increase ROM, increase tissue extensibility and decrease trigger points to improve the patients ability to walk without pain          With   [] TE   [] TA   [] neuro   [] other: Patient Education: [x] Review HEP    [] Progressed/Changed HEP based on:   [] positioning   [] body mechanics   [] transfers   [] heat/ice application    [] other:      Other Objective/Functional Measures:     Pain Level (0-10 scale) post treatment: 0    ASSESSMENT/Changes in Function:     Patient will continue to benefit from skilled PT services to modify and progress therapeutic interventions, address functional mobility deficits, address ROM deficits, address strength deficits, analyze and address soft tissue restrictions, analyze and cue movement patterns, analyze and modify body mechanics/ergonomics and assess and modify postural abnormalities to attain remaining goals.      []  See Plan of Care  []  See progress note/recertification  []  See Discharge Summary         Progress towards goals / Updated goals:  Patient will continue to benefit from 1-2 further visits to improve tissue extensibility and allow for improved running mechanics. PLAN  [x]  Upgrade activities as tolerated     [x]  Continue plan of care  []  Update interventions per flow sheet       []  Discharge due to:_  []  Other:_      Mayi Ward PT , DPT, OCS, Cert.  DN   12/13/2018  11:56 AM

## 2019-01-03 ENCOUNTER — APPOINTMENT (OUTPATIENT)
Dept: PHYSICAL THERAPY | Age: 29
End: 2019-01-03
Payer: COMMERCIAL

## 2019-01-10 ENCOUNTER — APPOINTMENT (OUTPATIENT)
Dept: PHYSICAL THERAPY | Age: 29
End: 2019-01-10
Payer: COMMERCIAL

## 2019-01-15 ENCOUNTER — HOSPITAL ENCOUNTER (OUTPATIENT)
Dept: PHYSICAL THERAPY | Age: 29
Discharge: HOME | End: 2019-01-15
Payer: COMMERCIAL

## 2019-01-15 PROCEDURE — 97014 ELECTRIC STIMULATION THERAPY: CPT | Performed by: PHYSICAL THERAPIST

## 2019-01-15 PROCEDURE — 97140 MANUAL THERAPY 1/> REGIONS: CPT | Performed by: PHYSICAL THERAPIST

## 2019-01-15 NOTE — PROGRESS NOTES
PT DAILY TREATMENT NOTE - [facility] 2-15    Patient Name: Marcelina   Date:1/15/2019  : 1990  [x]  Patient  Verified  Payor: Nancy Main / Plan: Nancei Sanders 77 PPO / Product Type: Commerical /    In time:5:30 PM  Out time:6:20 PM  Total Treatment Time (min): 50  Total Timed Codes (min): 40  1:1 Treatment Time ( only): 40  Visit #: 14    Treatment Area: Pain in right leg [M79.604]    SUBJECTIVE  Pain Level (0-10 scale): 2/10  Any medication changes, allergies to medications, adverse drug reactions, diagnosis change, or new procedure performed?: [x] No    [] Yes (see summary sheet for update)  Subjective functional status/changes:   [] No changes reported  Patient reports a new onset of right heel pain over the past two weeks following a trail run. She believes it is related to continuing to maintain her weight along the outside of her right foot, but had a full relief of pain after her last DN visit.     OBJECTIVE    Modality rationale: decrease pain and increase tissue extensibility to improve the patients ability to walk without pain   Type Additional Details   [x] Estim: []Att   []Unatt        []TENS instruct                  []IFC  []Premod   []NMES                     [x]Other: asymmetrical, biphasic, 300 usec, 2 Hz []w/US   []w/ice   []w/heat  [x] w/ needles  Position: prone  Location: right gastroc   []  Traction: [] Cervical       []Lumbar                       [] Prone          []Supine                       []Intermittent   []Continuous Lbs:  [] before manual  [] after manual  []w/heat   []  Ultrasound: []Continuous   [x] Pulsed at:50%                           []1MHz   [x]3MHz Location: right lower leg  W/cm2:1.0   [] Paraffin         Location:   []w/heat   []  Ice     []  Heat  []  Ice massage Position:  Location:   []  Laser  []  Other: Position:  Location:   []  Vasopneumatic Device Pressure:       [] lo [] med [] hi   Temperature:      [x] Skin assessment post-treatment: [x]intact []redness- no adverse reaction    []redness  adverse reaction:       []redness  adverse reaction:      10 min Neuromuscular Re-education:  [x] See flow sheet :   Rationale: increase ROM, increase strength and improve coordination to improve the patients ability to walk without pain    30 min Manual Therapy:   DN was performed along the following musculature along the right foot:  Quadratus plantae muscle belly  Flexor hallucis brevis muscle belly  Abductor digit minimi muscle belly  Gastrocnemius (3 needles in medial muscle belly along trigger point sites)  30 mm x .20 mm needles were used throughout except for gastrocnemius, 50 mm x .30 mm    DN was performed prior to the following manual therapy techniques to allow for improved ROM and decreased pain with walking:  Distraction gd III and IV right talocrural jt; fibular glides gd III and IV right distal jt; A/P talocrural jt mobs gd III and IV   Rationale: decrease pain, increase ROM, increase tissue extensibility and decrease trigger points to improve the patients ability to walk without pain          With   [] TE   [] TA   [] neuro   [] other: Patient Education: [x] Review HEP    [] Progressed/Changed HEP based on:   [] positioning   [] body mechanics   [] transfers   [] heat/ice application    [] other:      Other Objective/Functional Measures:   Significant LTR's elicited with DN to right medial gastrocnemius  Pain Level (0-10 scale) post treatment: 0    ASSESSMENT/Changes in Function:     Patient will continue to benefit from skilled PT services to modify and progress therapeutic interventions, address functional mobility deficits, address ROM deficits, address strength deficits, analyze and address soft tissue restrictions, analyze and cue movement patterns, analyze and modify body mechanics/ergonomics and assess and modify postural abnormalities to attain remaining goals.      []  See Plan of Care  []  See progress note/recertification  []  See Discharge Summary         Progress towards goals / Updated goals:  Patient tolerated today's therapy focused on decreasing right heel pain and will continue to focus on improving gait mechanics with DN to allow for decreased pain in all ADL's including running. PLAN  [x]  Upgrade activities as tolerated     [x]  Continue plan of care  []  Update interventions per flow sheet       []  Discharge due to:_  []  Other:_      Jaime Fountain PT , DPT, OCS, Cert.  DN   1/15/2019  11:56 AM

## 2019-01-29 ENCOUNTER — APPOINTMENT (OUTPATIENT)
Dept: PHYSICAL THERAPY | Age: 29
End: 2019-01-29
Payer: COMMERCIAL

## 2019-02-14 ENCOUNTER — HOSPITAL ENCOUNTER (OUTPATIENT)
Dept: PHYSICAL THERAPY | Age: 29
Discharge: HOME | End: 2019-02-14
Payer: COMMERCIAL

## 2019-02-14 PROCEDURE — 97014 ELECTRIC STIMULATION THERAPY: CPT | Performed by: PHYSICAL THERAPIST

## 2019-02-14 PROCEDURE — 97140 MANUAL THERAPY 1/> REGIONS: CPT | Performed by: PHYSICAL THERAPIST

## 2019-02-28 ENCOUNTER — HOSPITAL ENCOUNTER (OUTPATIENT)
Dept: PHYSICAL THERAPY | Age: 29
Discharge: HOME | End: 2019-02-28
Payer: COMMERCIAL

## 2019-02-28 PROCEDURE — 97140 MANUAL THERAPY 1/> REGIONS: CPT | Performed by: PHYSICAL THERAPIST

## 2019-02-28 PROCEDURE — 97014 ELECTRIC STIMULATION THERAPY: CPT | Performed by: PHYSICAL THERAPIST

## 2019-02-28 NOTE — PROGRESS NOTES
PT DAILY TREATMENT NOTE - [facility] 2-15    Patient Name: Ellie Landing  Date:2019  : 1990  [x]  Patient  Verified  Payor: Mely Bell / Plan: Carry Derek Mendez 77 PPO / Product Type: Commerical /    In time 5:20 PM  Out time:6:00 PM  Total Treatment Time (min): 40  Total Timed Codes (min): 30  1:1 Treatment Time ( only): 30  Visit #: 16    Treatment Area: Pain in right leg [M79.640]    SUBJECTIVE  Pain Level (0-10 scale): sore  Any medication changes, allergies to medications, adverse drug reactions, diagnosis change, or new procedure performed?: [x] No    [] Yes (see summary sheet for update)  Subjective functional status/changes:   [] No changes reported  Patient returns to the clinic after several weeks of performing eccentric heel raises. Overall, her symptoms have improved and she is able to push off her right foot with less pain. She is happy with this progress and is eager to return to training at full strength.     OBJECTIVE    Modality rationale: decrease pain and increase tissue extensibility to improve the patients ability to walk without pain   Type Additional Details   [x] Estim: []Att   []Unatt        []TENS instruct                  []IFC  []Premod   []NMES                     [x]Other: asymmetrical, biphasic, 300 usec, 2 Hz []w/US   []w/ice   []w/heat  [x] w/ needles  Position: prone  Location: right gastroc   []  Traction: [] Cervical       []Lumbar                       [] Prone          []Supine                       []Intermittent   []Continuous Lbs:  [] before manual  [] after manual  []w/heat   []  Ultrasound: []Continuous   [x] Pulsed at:50%                           []1MHz   [x]3MHz Location: right lower leg  W/cm2:1.0   [] Paraffin         Location:   []w/heat   []  Ice     []  Heat  []  Ice massage Position:  Location:   []  Laser  []  Other: Position:  Location:   []  Vasopneumatic Device Pressure:       [] lo [] med [] hi   Temperature:      [x] Skin assessment post-treatment:  [x]intact []redness- no adverse reaction    []redness  adverse reaction:       []redness  adverse reaction:      5 min Therapeutic exercise:  [x] See flow sheet :   Rationale: increase ROM, increase strength and improve coordination to improve the patients ability to walk without pain    25 min Manual Therapy:   DN was performed along the following musculature along the right ankle:  Cleveland's tendon, beginning at the calcaneal attachment and extending superiorly at two finger breaths apart. 40 mm x .30 mm needles were inserted tangentially on both sides of the tendon with winding performed. 6 needles totoal  Gastrocnemius (6 needles in medial muscle belly along trigger point sites) 60 mm x .30 mm    DN was performed prior to the following manual therapy techniques to allow for improved ROM and decreased pain with walking:  STM with therastick to R gastrocnemius   Rationale: decrease pain, increase ROM, increase tissue extensibility and decrease trigger points to improve the patients ability to walk without pain          With   [] TE   [] TA   [] neuro   [] other: Patient Education: [x] Review HEP    [] Progressed/Changed HEP based on:   [] positioning   [] body mechanics   [] transfers   [] heat/ice application    [] other:      Other Objective/Functional Measures:   Continued impairments from last visit, although less severe:   Significant LTR's elicited with DN to right medial gastrocnemius   MTrP's palpated through medial gastrocnemius may be causing abnormal loading through the R Earl's tendon during gait    Pain Level (0-10 scale) post treatment: 0    ASSESSMENT/Changes in Function:   Eccentric strengthening is allowing for greater tolerance to both running and cycling.   Patient will continue to benefit from skilled PT services to modify and progress therapeutic interventions, address functional mobility deficits, address ROM deficits, address strength deficits, analyze and address soft tissue restrictions, analyze and cue movement patterns, analyze and modify body mechanics/ergonomics and assess and modify postural abnormalities to attain remaining goals. []  See Plan of Care  []  See progress note/recertification  []  See Discharge Summary         Progress towards goals / Updated goals:  Patient has responded well to eccentric calf strengthening program and will look towards discharge planning in two weeks. PLAN  [x]  Upgrade activities as tolerated     [x]  Continue plan of care  []  Update interventions per flow sheet       []  Discharge due to:_  []  Other:_      Kevin , PT , DPT, OCS, Cert.  DN   2/28/2019  11:56 AM

## 2019-03-12 ENCOUNTER — APPOINTMENT (OUTPATIENT)
Dept: PHYSICAL THERAPY | Age: 29
End: 2019-03-12
Payer: COMMERCIAL

## 2019-03-28 ENCOUNTER — HOSPITAL ENCOUNTER (OUTPATIENT)
Dept: PHYSICAL THERAPY | Age: 29
Discharge: HOME | End: 2019-03-28
Payer: COMMERCIAL

## 2019-03-28 PROCEDURE — 97140 MANUAL THERAPY 1/> REGIONS: CPT | Performed by: PHYSICAL THERAPIST

## 2019-03-28 PROCEDURE — 97014 ELECTRIC STIMULATION THERAPY: CPT | Performed by: PHYSICAL THERAPIST

## 2019-03-28 NOTE — PROGRESS NOTES
PT DAILY TREATMENT NOTE - [facility] 2-15    Patient Name: Damaris Cleaning  Date:3/28/2019  : 1990  [x]  Patient  Verified  Payor: Nancy Estimable / Plan: Nancie Sanders 77 PPO / Product Type: Commerical /    In time 6:20 PM  Out time:7:00 PM  Total Treatment Time (min): 40  Total Timed Codes (min): 30  1:1 Treatment Time ( only): 30  Visit #: 17    Treatment Area: Pain in right leg [M79.604]    SUBJECTIVE  Pain Level (0-10 scale): 2/10  Any medication changes, allergies to medications, adverse drug reactions, diagnosis change, or new procedure performed?: [x] No    [] Yes (see summary sheet for update)  Subjective functional status/changes:   [] No changes reported  Patient reports that she was able to consistently run for 3 weeks following her last visit with no pain or limitations. Over this past week, she noticed an onset of similar lateral foot pain that had bothered her previously. She is concerned about the development of a stress reaction along the bone.     OBJECTIVE    Modality rationale: decrease pain and increase tissue extensibility to improve the patients ability to walk without pain   Type Additional Details   [x] Estim: []Att   []Unatt        []TENS instruct                  []IFC  []Premod   []NMES                     [x]Other: asymmetrical, biphasic, 300 usec, 2 Hz []w/US   []w/ice   []w/heat  [x] w/ needles  Position: prone  Location: right gastroc   []  Traction: [] Cervical       []Lumbar                       [] Prone          []Supine                       []Intermittent   []Continuous Lbs:  [] before manual  [] after manual  []w/heat   []  Ultrasound: []Continuous   [x] Pulsed at:50%                           []1MHz   [x]3MHz Location: right lower leg  W/cm2:1.0   [] Paraffin         Location:   []w/heat   []  Ice     []  Heat  []  Ice massage Position:  Location:   []  Laser  []  Other: Position:  Location:   []  Vasopneumatic Device Pressure:       [] lo [] med [] hi Temperature:      [x] Skin assessment post-treatment:  [x]intact []redness- no adverse reaction    []redness  adverse reaction:       []redness  adverse reaction:      5 min Therapeutic exercise:  [x] See flow sheet :   Rationale: increase ROM, increase strength and improve coordination to improve the patients ability to walk without pain    25 min Manual Therapy:   DN was performed along the following musculature along the right ankle:  R peroneus tertius muscle belly (2x 30 mm x .20 mm needles)  R peroneus brevis muscle belly (2x 30 mm x .20 mm)  R peroneus brevis/tertius attachment at 5th metatarsal  R  Gastrocnemius (6 needles in medial muscle belly along trigger point sites) 60 mm x .30 mm    DN was performed prior to the following manual therapy techniques to allow for improved ROM and decreased pain with walking:  STM with therastick to R gastrocnemius   Rationale: decrease pain, increase ROM, increase tissue extensibility and decrease trigger points to improve the patients ability to walk without pain          With   [] TE   [] TA   [] neuro   [] other: Patient Education: [x] Review HEP    [] Progressed/Changed HEP based on:   [] positioning   [] body mechanics   [] transfers   [] heat/ice application    [] other:      Other Objective/Functional Measures:   No significant pain reported with contact onto 5th metatarsal with needle    Pain Level (0-10 scale) post treatment: 0    ASSESSMENT/Changes in Function:   Decreased tolerance to weight bearing observed over the lateral foot  Patient will continue to benefit from skilled PT services to modify and progress therapeutic interventions, address functional mobility deficits, address ROM deficits, address strength deficits, analyze and address soft tissue restrictions, analyze and cue movement patterns, analyze and modify body mechanics/ergonomics and assess and modify postural abnormalities to attain remaining goals.      []  See Plan of Care  []  See progress note/recertification  []  See Discharge Summary         Progress towards goals / Updated goals:  Patient has seen a setback towards final push towards discharge. Will continue to monitor lateral foot pain and refer patient back to orthopedic surgeon should her progress continue to decline. PLAN  [x]  Upgrade activities as tolerated     [x]  Continue plan of care  []  Update interventions per flow sheet       []  Discharge due to:_  []  Other:_      Jose Francisco Paul PT , DPT, OCS, Cert.  DN   3/28/2019  11:56 AM

## 2019-04-23 ENCOUNTER — APPOINTMENT (OUTPATIENT)
Dept: PHYSICAL THERAPY | Age: 29
End: 2019-04-23
Payer: COMMERCIAL

## 2019-04-30 ENCOUNTER — HOSPITAL ENCOUNTER (OUTPATIENT)
Dept: PHYSICAL THERAPY | Age: 29
Discharge: HOME | End: 2019-04-30
Payer: COMMERCIAL

## 2019-04-30 PROCEDURE — 97014 ELECTRIC STIMULATION THERAPY: CPT | Performed by: PHYSICAL THERAPIST

## 2019-04-30 PROCEDURE — 97140 MANUAL THERAPY 1/> REGIONS: CPT | Performed by: PHYSICAL THERAPIST

## 2019-04-30 PROCEDURE — 97112 NEUROMUSCULAR REEDUCATION: CPT | Performed by: PHYSICAL THERAPIST

## 2019-04-30 NOTE — PROGRESS NOTES
PT DAILY TREATMENT NOTE - [facility] 2-15    Patient Name: Brad Scriver  Date:2019  : 1990  [x]  Patient  Verified  Payor: Mello Goode / Plan: [employer]CTOR[employer] / Product Type: HMO /    In time 6:00 PM  Out time:6:40 PM  Total Treatment Time (min): 40  Total Timed Codes (min): 30  1:1 Treatment Time ( only): 30  Visit #: 18    Treatment Area: Pain in right leg [M04.266]    SUBJECTIVE  Pain Level (0-10 scale): 1/10  Any medication changes, allergies to medications, adverse drug reactions, diagnosis change, or new procedure performed?: [x] No    [] Yes (see summary sheet for update)  Subjective functional status/changes:   [] No changes reported  Patient reports a slight improvement in her foot pain. She has been able to continue running regularly, but is having increased calf soreness.     OBJECTIVE    Modality rationale: decrease pain and increase tissue extensibility to improve the patients ability to walk without pain   Type Additional Details   [x] Estim: []Att   []Unatt        []TENS instruct                  []IFC  []Premod   []NMES                     [x]Other: asymmetrical, biphasic, 300 usec, 2 Hz []w/US   []w/ice   []w/heat  [x] w/ needles  Position: prone  Location: right gastroc   []  Traction: [] Cervical       []Lumbar                       [] Prone          []Supine                       []Intermittent   []Continuous Lbs:  [] before manual  [] after manual  []w/heat   []  Ultrasound: []Continuous   [x] Pulsed at:50%                           []1MHz   [x]3MHz Location: right lower leg  W/cm2:1.0   [] Paraffin         Location:   []w/heat   []  Ice     []  Heat  []  Ice massage Position:  Location:   []  Laser  []  Other: Position:  Location:   []  Vasopneumatic Device Pressure:       [] lo [] med [] hi   Temperature:      [x] Skin assessment post-treatment:  [x]intact []redness- no adverse reaction    []redness  adverse reaction:       []redness  adverse reaction:      5 min Therapeutic exercise:  [x] See flow sheet :   Rationale: increase ROM, increase strength and improve coordination to improve the patients ability to walk without pain    25 min Manual Therapy:   DN was performed along the following musculature along the right ankle:  R peroneus tertius muscle belly (2x 30 mm x .20 mm needles)  R peroneus brevis muscle belly (2x 30 mm x .20 mm)  R peroneus brevis/tertius attachment at 5th metatarsal  R  Gastrocnemius (6 needles in medial muscle belly along trigger point sites) 60 mm x .30 mm    DN was performed prior to the following manual therapy techniques to allow for improved ROM and decreased pain with walking:  STM with therastick to R gastrocnemius   Rationale: decrease pain, increase ROM, increase tissue extensibility and decrease trigger points to improve the patients ability to walk without pain          With   [] TE   [] TA   [] neuro   [] other: Patient Education: [x] Review HEP    [] Progressed/Changed HEP based on:   [] positioning   [] body mechanics   [] transfers   [] heat/ice application    [] other:      Other Objective/Functional Measures:   No significant pain reported with contact onto 5th metatarsal with needle    Pain Level (0-10 scale) post treatment: 0    ASSESSMENT/Changes in Function:   Decreased tolerance to weight bearing observed over the lateral foot  Patient will continue to benefit from skilled PT services to modify and progress therapeutic interventions, address functional mobility deficits, address ROM deficits, address strength deficits, analyze and address soft tissue restrictions, analyze and cue movement patterns, analyze and modify body mechanics/ergonomics and assess and modify postural abnormalities to attain remaining goals.      []  See Plan of Care  []  See progress note/recertification  []  See Discharge Summary         Progress towards goals / Updated goals:  Patient is now showing greater progress overall compared to her last visit and will likely discharge on her next visit to her HEP only. PLAN  [x]  Upgrade activities as tolerated     [x]  Continue plan of care  []  Update interventions per flow sheet       []  Discharge due to:_  []  Other:_      Trisha Ma, PT , DPT, OCS, Cert.  DN   4/30/2019  11:56 AM

## 2019-05-14 ENCOUNTER — APPOINTMENT (OUTPATIENT)
Dept: PHYSICAL THERAPY | Age: 29
End: 2019-05-14
Payer: COMMERCIAL

## 2019-05-24 ENCOUNTER — HOSPITAL ENCOUNTER (EMERGENCY)
Age: 29
Discharge: HOME | End: 2019-05-24
Attending: EMERGENCY MEDICINE
Payer: COMMERCIAL

## 2019-05-24 ENCOUNTER — APPOINTMENT (OUTPATIENT)
Dept: CT IMAGING | Age: 29
End: 2019-05-24
Attending: NURSE PRACTITIONER
Payer: COMMERCIAL

## 2019-05-24 VITALS
SYSTOLIC BLOOD PRESSURE: 102 MMHG | DIASTOLIC BLOOD PRESSURE: 67 MMHG | HEART RATE: 64 BPM | HEIGHT: 72 IN | OXYGEN SATURATION: 99 % | BODY MASS INDEX: 20.05 KG/M2 | TEMPERATURE: 97.9 F | WEIGHT: 148 LBS | RESPIRATION RATE: 18 BRPM

## 2019-05-24 DIAGNOSIS — G43.809 OTHER MIGRAINE WITHOUT STATUS MIGRAINOSUS, NOT INTRACTABLE: Primary | ICD-10-CM

## 2019-05-24 LAB
ANION GAP SERPL CALC-SCNC: 4 MMOL/L (ref 5–15)
BASOPHILS # BLD: 0 K/UL (ref 0–0.1)
BASOPHILS NFR BLD: 0 % (ref 0–1)
BUN SERPL-MCNC: 13 MG/DL (ref 6–20)
BUN/CREAT SERPL: 15 (ref 12–20)
CALCIUM SERPL-MCNC: 9.6 MG/DL (ref 8.5–10.1)
CHLORIDE SERPL-SCNC: 106 MMOL/L (ref 97–108)
CO2 SERPL-SCNC: 28 MMOL/L (ref 21–32)
CREAT SERPL-MCNC: 0.87 MG/DL (ref 0.55–1.02)
DIFFERENTIAL METHOD BLD: ABNORMAL
EOSINOPHIL # BLD: 0 K/UL (ref 0–0.4)
EOSINOPHIL NFR BLD: 0 % (ref 0–7)
ERYTHROCYTE [DISTWIDTH] IN BLOOD BY AUTOMATED COUNT: 12.5 % (ref 11.5–14.5)
GLUCOSE SERPL-MCNC: 94 MG/DL (ref 65–100)
HCG UR QL: NEGATIVE
HCT VFR BLD AUTO: 45.2 % (ref 35–47)
HGB BLD-MCNC: 15.2 G/DL (ref 11.5–16)
IMM GRANULOCYTES # BLD AUTO: 0.1 K/UL (ref 0–0.04)
IMM GRANULOCYTES NFR BLD AUTO: 0 % (ref 0–0.5)
LYMPHOCYTES # BLD: 1.4 K/UL (ref 0.8–3.5)
LYMPHOCYTES NFR BLD: 11 % (ref 12–49)
MCH RBC QN AUTO: 30.1 PG (ref 26–34)
MCHC RBC AUTO-ENTMCNC: 33.6 G/DL (ref 30–36.5)
MCV RBC AUTO: 89.5 FL (ref 80–99)
MONOCYTES # BLD: 0.7 K/UL (ref 0–1)
MONOCYTES NFR BLD: 5 % (ref 5–13)
NEUTS SEG # BLD: 10.5 K/UL (ref 1.8–8)
NEUTS SEG NFR BLD: 84 % (ref 32–75)
NRBC # BLD: 0 K/UL (ref 0–0.01)
NRBC BLD-RTO: 0 PER 100 WBC
PLATELET # BLD AUTO: 220 K/UL (ref 150–400)
PMV BLD AUTO: 10.8 FL (ref 8.9–12.9)
POTASSIUM SERPL-SCNC: 4.1 MMOL/L (ref 3.5–5.1)
RBC # BLD AUTO: 5.05 M/UL (ref 3.8–5.2)
SODIUM SERPL-SCNC: 138 MMOL/L (ref 136–145)
WBC # BLD AUTO: 12.7 K/UL (ref 3.6–11)

## 2019-05-24 PROCEDURE — 81025 URINE PREGNANCY TEST: CPT

## 2019-05-24 PROCEDURE — 85025 COMPLETE CBC W/AUTO DIFF WBC: CPT

## 2019-05-24 PROCEDURE — 80048 BASIC METABOLIC PNL TOTAL CA: CPT

## 2019-05-24 PROCEDURE — 99283 EMERGENCY DEPT VISIT LOW MDM: CPT

## 2019-05-24 PROCEDURE — 74011250636 HC RX REV CODE- 250/636: Performed by: NURSE PRACTITIONER

## 2019-05-24 PROCEDURE — 96374 THER/PROPH/DIAG INJ IV PUSH: CPT

## 2019-05-24 PROCEDURE — 36415 COLL VENOUS BLD VENIPUNCTURE: CPT

## 2019-05-24 PROCEDURE — 96375 TX/PRO/DX INJ NEW DRUG ADDON: CPT

## 2019-05-24 PROCEDURE — 70450 CT HEAD/BRAIN W/O DYE: CPT

## 2019-05-24 PROCEDURE — 96361 HYDRATE IV INFUSION ADD-ON: CPT

## 2019-05-24 RX ORDER — DEXAMETHASONE SODIUM PHOSPHATE 10 MG/ML
10 INJECTION INTRAMUSCULAR; INTRAVENOUS ONCE
Status: COMPLETED | OUTPATIENT
Start: 2019-05-24 | End: 2019-05-24

## 2019-05-24 RX ORDER — DIPHENHYDRAMINE HYDROCHLORIDE 50 MG/ML
50 INJECTION, SOLUTION INTRAMUSCULAR; INTRAVENOUS
Status: COMPLETED | OUTPATIENT
Start: 2019-05-24 | End: 2019-05-24

## 2019-05-24 RX ORDER — ONDANSETRON 2 MG/ML
4 INJECTION INTRAMUSCULAR; INTRAVENOUS
Status: COMPLETED | OUTPATIENT
Start: 2019-05-24 | End: 2019-05-24

## 2019-05-24 RX ADMIN — DEXAMETHASONE SODIUM PHOSPHATE 10 MG: 10 INJECTION INTRAMUSCULAR; INTRAVENOUS at 12:24

## 2019-05-24 RX ADMIN — ONDANSETRON 4 MG: 2 INJECTION INTRAMUSCULAR; INTRAVENOUS at 12:24

## 2019-05-24 RX ADMIN — SODIUM CHLORIDE 1000 ML: 900 INJECTION, SOLUTION INTRAVENOUS at 12:29

## 2019-05-24 RX ADMIN — DIPHENHYDRAMINE HYDROCHLORIDE 50 MG: 50 INJECTION, SOLUTION INTRAMUSCULAR; INTRAVENOUS at 12:24

## 2019-05-24 NOTE — ED PROVIDER NOTES
This is a 71-year-old female who presents ambulatory to the emergency room with complaints of a migraine headache. Patient states her migraine began yesterday afternoon. States that she took her Relpax at home 3 times over a 12 hour period with minimal relief of her migraine symptoms. Patient states that she has increasing photophobia, wearing sunglasses. Positive nausea, positive vomiting. Denies chest pain, shortness of breath, dizziness. Has had prior migraines in the past without imaging. There are no further complaints at this time. Ce Anne MD  History reviewed. No pertinent past medical history. Past Surgical History:  No date: HX TONSILLECTOMY             History reviewed. No pertinent past medical history. Past Surgical History:   Procedure Laterality Date    HX TONSILLECTOMY           History reviewed. No pertinent family history.     Social History     Socioeconomic History    Marital status: SINGLE     Spouse name: Not on file    Number of children: Not on file    Years of education: Not on file    Highest education level: Not on file   Occupational History    Not on file   Social Needs    Financial resource strain: Not on file    Food insecurity:     Worry: Not on file     Inability: Not on file    Transportation needs:     Medical: Not on file     Non-medical: Not on file   Tobacco Use    Smoking status: Never Smoker    Smokeless tobacco: Never Used   Substance and Sexual Activity    Alcohol use: Not on file    Drug use: Not on file    Sexual activity: Not on file   Lifestyle    Physical activity:     Days per week: Not on file     Minutes per session: Not on file    Stress: Not on file   Relationships    Social connections:     Talks on phone: Not on file     Gets together: Not on file     Attends [employer] service: Not on file     Active member of club or organization: Not on file     Attends meetings of clubs or organizations: Not on file     Relationship status: Not on file    Intimate partner violence:     Fear of current or ex partner: Not on file     Emotionally abused: Not on file     Physically abused: Not on file     Forced sexual activity: Not on file   Other Topics Concern    Not on file   Social History Narrative    Not on file         ALLERGIES: Pcn [penicillins]    Review of Systems   Constitutional: Negative for appetite change, chills, diaphoresis, fatigue and fever. HENT: Negative for congestion, ear discharge, ear pain, sinus pressure, sinus pain, sore throat and trouble swallowing. Eyes: Positive for photophobia. Negative for pain, redness and visual disturbance. Respiratory: Negative for chest tightness, shortness of breath and wheezing. Cardiovascular: Negative for chest pain and palpitations. Gastrointestinal: Positive for nausea and vomiting. Negative for abdominal distention and abdominal pain. Endocrine: Negative. Genitourinary: Negative for difficulty urinating, flank pain, frequency and urgency. Musculoskeletal: Negative for back pain, neck pain and neck stiffness. Skin: Negative for color change, pallor, rash and wound. Allergic/Immunologic: Negative. Neurological: Positive for headaches. Negative for dizziness, speech difficulty and weakness. Hematological: Does not bruise/bleed easily. Psychiatric/Behavioral: Negative for behavioral problems. The patient is not nervous/anxious. Vitals:    05/24/19 1115   BP: 132/85   Pulse: 77   Resp: 20   Temp: 97.7 °F (36.5 °C)   SpO2: 100%   Weight: 67.1 kg (148 lb)   Height: 6' (1.829 m)            Physical Exam   Constitutional: She is oriented to person, place, and time. She appears well-developed and well-nourished. No distress. HENT:   Head: Normocephalic and atraumatic. Right Ear: External ear normal.   Left Ear: External ear normal.   Nose: Nose normal.   Mouth/Throat: Oropharynx is clear and moist.   Eyes: Pupils are equal, round, and reactive to light. Conjunctivae and EOM are normal. Right eye exhibits no discharge. Left eye exhibits no discharge. Neck: Normal range of motion. Neck supple. No JVD present. No tracheal deviation present. Cardiovascular: Normal rate, regular rhythm, normal heart sounds and intact distal pulses. Exam reveals no gallop. No murmur heard. Pulmonary/Chest: Effort normal and breath sounds normal. No respiratory distress. She has no wheezes. She has no rales. She exhibits no tenderness. Abdominal: Soft. Bowel sounds are normal. She exhibits no distension. There is no tenderness. There is no rebound and no guarding. Genitourinary:   Genitourinary Comments: Negative     Musculoskeletal: Normal range of motion. She exhibits no edema or tenderness. Neurological: She is alert and oriented to person, place, and time. Skin: Skin is warm and dry. No rash noted. No erythema. No pallor. Psychiatric: She has a normal mood and affect. Her behavior is normal. Judgment and thought content normal.   Nursing note and vitals reviewed. MDM  Number of Diagnoses or Management Options  Other migraine without status migrainosus, not intractable: new and requires workup  Diagnosis management comments: Plan:  Discharge to home and follow up with PCP, follow up with neurology as an outpatient. Return to ED with worsening symptoms. Patient in agreement with plan of care. Amount and/or Complexity of Data Reviewed  Clinical lab tests: ordered and reviewed  Tests in the radiology section of CPT®: ordered and reviewed          2:00 PM   Patient states improved symptoms. States she feels as though she can go home. Patient will follow up with neurologgy as an outpatient. 2:22 PM  Pt has been reexamined. Pt has no new complaints, changes or physical findings. Care plan outlined and precautions discussed. All available results were reviewed with pt. All medications were reviewed with pt.  All of pt's questions and concerns were addressed. Pt agrees to F/U as instructed and agrees to return to ED upon further deterioration. Pt is ready to go home.   Felicita Adames NP      Procedures

## 2019-05-24 NOTE — ED TRIAGE NOTES
Pt reports having a migraine that began yesterday afternoon. Pt states she took Relpax 3 time over a 12 hour period with minimal relief. Pt also reports nausea , vomiting and light sensitivity.

## 2019-05-24 NOTE — DISCHARGE INSTRUCTIONS
Patient Education        Migraine Headache: Care Instructions  Your Care Instructions  Migraines are painful, throbbing headaches that often start on one side of the head. They may cause nausea and vomiting and make you sensitive to light, sound, or smell. Without treatment, migraines can last from 4 hours to a few days. Medicines can help prevent migraines or stop them after they have started. Your doctor can help you find which ones work best for you. Follow-up care is a key part of your treatment and safety. Be sure to make and go to all appointments, and call your doctor if you are having problems. It's also a good idea to know your test results and keep a list of the medicines you take. How can you care for yourself at home? · Do not drive if you have taken a prescription pain medicine. · Rest in a quiet, dark room until your headache is gone. Close your eyes, and try to relax or go to sleep. Don't watch TV or read. · Put a cold, moist cloth or cold pack on the painful area for 10 to 20 minutes at a time. Put a thin cloth between the cold pack and your skin. · Use a warm, moist towel or a heating pad set on low to relax tight shoulder and neck muscles. · Have someone gently massage your neck and shoulders. · Take your medicines exactly as prescribed. Call your doctor if you think you are having a problem with your medicine. You will get more details on the specific medicines your doctor prescribes. · Be careful not to take pain medicine more often than the instructions allow. You could get worse or more frequent headaches when the medicine wears off. To prevent migraines  · Keep a headache diary so you can figure out what triggers your headaches. Avoiding triggers may help you prevent headaches. Record when each headache began, how long it lasted, and what the pain was like.  (Was it throbbing, aching, stabbing, or dull?) Write down any other symptoms you had with the headache, such as nausea, flashing lights or dark spots, or sensitivity to bright light or loud noise. Note if the headache occurred near your period. List anything that might have triggered the headache. Triggers may include certain foods (chocolate, cheese, wine) or odors, smoke, bright light, stress, or lack of sleep. · If your doctor has prescribed medicine for your migraines, take it as directed. You may have medicine that you take only when you get a migraine and medicine that you take all the time to help prevent migraines. ? If your doctor has prescribed medicine for when you get a headache, take it at the first sign of a migraine, unless your doctor has given you other instructions. ? If your doctor has prescribed medicine to prevent migraines, take it exactly as prescribed. Call your doctor if you think you are having a problem with your medicine. · Find healthy ways to deal with stress. Migraines are most common during or right after stressful times. Take time to relax before and after you do something that has caused a migraine in the past.  · Try to keep your muscles relaxed by keeping good posture. Check your jaw, face, neck, and shoulder muscles for tension. Try to relax them. When you sit at a desk, change positions often. And make sure to stretch for 30 seconds each hour. · Get plenty of sleep and exercise. · Eat meals on a regular schedule. Avoid foods and drinks that often trigger migraines. These include chocolate, alcohol (especially red wine and port), aspartame, monosodium glutamate (MSG), and some additives found in foods (such as hot dogs, sumner, cold cuts, aged cheeses, and pickled foods). · Limit caffeine. Don't drink too much coffee, tea, or soda. But don't quit caffeine suddenly. That can also give you migraines. · Do not smoke or allow others to smoke around you. If you need help quitting, talk to your doctor about stop-smoking programs and medicines.  These can increase your chances of quitting for good.  · If you are taking birth control pills or hormone therapy, talk to your doctor about whether they are triggering your migraines. When should you call for help? Call 911 anytime you think you may need emergency care. For example, call if:    · You have signs of a stroke. These may include:  ? Sudden numbness, paralysis, or weakness in your face, arm, or leg, especially on only one side of your body. ? Sudden vision changes. ? Sudden trouble speaking. ? Sudden confusion or trouble understanding simple statements. ? Sudden problems with walking or balance. ? A sudden, severe headache that is different from past headaches.    Call your doctor now or seek immediate medical care if:    · You have new or worse nausea and vomiting.     · You have a new or higher fever.     · Your headache gets much worse.    Watch closely for changes in your health, and be sure to contact your doctor if:    · You are not getting better after 2 days (48 hours). Where can you learn more? Go to http://rebeka-francis.info/. Enter M906 in the search box to learn more about \"Migraine Headache: Care Instructions. \"  Current as of: Fatoumata 3, 2018  Content Version: 11.9  © 5321-6452 Healthwise, Incorporated. Care instructions adapted under license by [employer] (which disclaims liability or warranty for this information). If you have questions about a medical condition or this instruction, always ask your healthcare professional. [place] [address] any warranty or liability for your use of this information. Patient Education        Deciding About Taking Medicine to Prevent Migraines  How can you decide about taking medicine to prevent migraine headaches? What are migraines? Migraines are painful, throbbing headaches. They can last from 4 to 72 hours. They often occur on only one side of your head. But you may feel them on both sides.  The pain may keep you from doing your daily activities. You may take a daily medicine if you get bad migraines often. This can help prevent them. What are key points about this decision? · Medicines to prevent migraines may not stop them every time. But if you take them daily, you can reduce how many migraines you get by more than half. They can also reduce how long migraines last. And your symptoms may not be as bad. · Medicines that prevent migraines may cause side effects. You may have sleep and memory problems, upset stomach, dry mouth, or constipation. Some of these side effects may last for as long as you take the medicine. Or they may go away within a few weeks. Why might you choose to take medicine to prevent migraines? · You are willing to take medicine daily if it will help your symptoms. · You don't think the side effects of the medicine could be as bad as your migraine symptoms. · Your migraines get in the way of your work. Or they harm your relationships with friends and family. · Benefits of medicine include fewer or no migraines. And your migraines may not last as long or feel as bad. Why might you choose not to take medicine to prevent migraines? · You want to avoid the side effects of the medicine. · You don't want to take medicine every day. · Your migraines are not affecting your work and relationships. · If your symptoms don't improve with home treatment and other medicines, you can decide later to take medicine every day to help prevent migraines. Your decision  Thinking about the facts and your feelings can help you make a decision that is right for you. Be sure you understand the benefits and risks of your options, and think about what else you need to do before you make the decision. Where can you learn more? Go to http://rebeka-francis.info/. Enter R371 in the search box to learn more about \"Deciding About Taking Medicine to Prevent Migraines. \"  Current as of: Fatoumata 3, 2018  Content Version: 11.9  © 6597-5646 Healthwise, Incorporated. Care instructions adapted under license by [employer] (which disclaims liability or warranty for this information). If you have questions about a medical condition or this instruction, always ask your healthcare professional. [place] [address] any warranty or liability for your use of this information.

## 2019-05-28 ENCOUNTER — HOSPITAL ENCOUNTER (OUTPATIENT)
Dept: PHYSICAL THERAPY | Age: 29
Discharge: HOME | End: 2019-05-28
Payer: COMMERCIAL

## 2019-05-28 PROCEDURE — 97014 ELECTRIC STIMULATION THERAPY: CPT | Performed by: PHYSICAL THERAPIST

## 2019-05-28 PROCEDURE — 97140 MANUAL THERAPY 1/> REGIONS: CPT | Performed by: PHYSICAL THERAPIST

## 2019-05-28 PROCEDURE — 97110 THERAPEUTIC EXERCISES: CPT | Performed by: PHYSICAL THERAPIST

## 2019-05-28 NOTE — PROGRESS NOTES
PT DAILY TREATMENT NOTE - [facility] 2-15    Patient Name: Mercedes Greene  Date:2019  : 1990  [x]  Patient  Verified  Payor: Gisela Jauregui / Plan: [employer] / Product Type: HMO /    In time 6:00 PM  Out time:6:40 PM  Total Treatment Time (min): 40  Total Timed Codes (min): 30  1:1 Treatment Time ( only): 30  Visit #: 19    Treatment Area: Pain in right leg [M79.604]    SUBJECTIVE  Pain Level (0-10 scale): 0/10  Any medication changes, allergies to medications, adverse drug reactions, diagnosis change, or new procedure performed?: [x] No    [] Yes (see summary sheet for update)  Subjective functional status/changes:   [] No changes reported  Patient reports no pain since her last visit and was able to compete in a race two weeks ago. She feels comfortable moving forward with discharge today.     OBJECTIVE    Modality rationale: decrease pain and increase tissue extensibility to improve the patients ability to walk without pain   Type Additional Details   [x] Estim: []Att   []Unatt        []TENS instruct                  []IFC  []Premod   []NMES                     [x]Other: asymmetrical, biphasic, 300 usec, 2 Hz []w/US   []w/ice   []w/heat  [x] w/ needles  Position: prone  Location: right gastroc   []  Traction: [] Cervical       []Lumbar                       [] Prone          []Supine                       []Intermittent   []Continuous Lbs:  [] before manual  [] after manual  []w/heat   []  Ultrasound: []Continuous   [x] Pulsed at:50%                           []1MHz   [x]3MHz Location: right lower leg  W/cm2:1.0   [] Paraffin         Location:   []w/heat   []  Ice     []  Heat  []  Ice massage Position:  Location:   []  Laser  []  Other: Position:  Location:   []  Vasopneumatic Device Pressure:       [] lo [] med [] hi   Temperature:      [x] Skin assessment post-treatment:  [x]intact []redness- no adverse reaction    []redness  adverse reaction:       []redness  adverse reaction:    5 min Therapeutic exercise:  [x] See flow sheet :   Rationale: increase ROM, increase strength and improve coordination to improve the patients ability to walk without pain    25 min Manual Therapy:   DN was performed along the following musculature along the right ankle:  R peroneus tertius muscle belly (2x 30 mm x .20 mm needles)  R peroneus brevis muscle belly (2x 30 mm x .20 mm)  R peroneus brevis/tertius attachment at 5th metatarsal  R  Gastrocnemius (6 needles in medial muscle belly along trigger point sites) 60 mm x .30 mm    DN was performed prior to the following manual therapy techniques to allow for improved ROM and decreased pain with walking:  STM with therastick to R gastrocnemius   Rationale: decrease pain, increase ROM, increase tissue extensibility and decrease trigger points to improve the patients ability to walk without pain          With   [] TE   [] TA   [] neuro   [] other: Patient Education: [x] Review HEP    [] Progressed/Changed HEP based on:   [] positioning   [] body mechanics   [] transfers   [] heat/ice application    [] other:      Other Objective/Functional Measures:     Pain Level (0-10 scale) post treatment: 0    ASSESSMENT/Changes in Function:   Decreased tolerance to weight bearing observed over the lateral foot  Patient will continue to benefit from skilled PT services to modify and progress therapeutic interventions, address functional mobility deficits, address ROM deficits, address strength deficits, analyze and address soft tissue restrictions, analyze and cue movement patterns, analyze and modify body mechanics/ergonomics and assess and modify postural abnormalities to attain remaining goals.      []  See Plan of Care  []  See progress note/recertification  [x]  See Discharge Summary          PLAN  [x]  Upgrade activities as tolerated     [x]  Continue plan of care  []  Update interventions per flow sheet       []  Discharge due to:_  []  Other:_      Antonietta Murphy PT , DPT, OCS, Cert.  DN   5/28/2019  11:56 AM

## 2019-05-29 ENCOUNTER — OFFICE VISIT (OUTPATIENT)
Dept: NEUROLOGY | Age: 29
End: 2019-05-29

## 2019-05-29 VITALS
HEART RATE: 72 BPM | DIASTOLIC BLOOD PRESSURE: 80 MMHG | WEIGHT: 148 LBS | SYSTOLIC BLOOD PRESSURE: 98 MMHG | OXYGEN SATURATION: 100 % | RESPIRATION RATE: 18 BRPM | BODY MASS INDEX: 20.05 KG/M2 | HEIGHT: 72 IN | TEMPERATURE: 97.6 F

## 2019-05-29 DIAGNOSIS — G43.401 HEMIPLEGIC MIGRAINE WITH STATUS MIGRAINOSUS, NOT INTRACTABLE: Primary | ICD-10-CM

## 2019-05-29 RX ORDER — DICLOFENAC SODIUM 75 MG/1
75 TABLET, DELAYED RELEASE ORAL
COMMUNITY
Start: 2018-06-26

## 2019-05-29 RX ORDER — PROMETHAZINE HYDROCHLORIDE 25 MG/1
TABLET ORAL
COMMUNITY
Start: 2015-04-14

## 2019-05-29 RX ORDER — KETOROLAC TROMETHAMINE 15.75 MG/1
SPRAY, METERED NASAL
Qty: 5 EACH | Refills: 2 | Status: SHIPPED | OUTPATIENT
Start: 2019-05-29

## 2019-05-29 NOTE — PROGRESS NOTES
Visit Vitals  BP 98/80 (BP 1 Location: Left arm, BP Patient Position: Sitting)   Pulse 72   Temp 97.6 °F (36.4 °C) (Oral)   Resp 18   Ht 6' (1.829 m)   Wt 67.1 kg (148 lb)   SpO2 100%   BMI 20.07 kg/m²       Chief Complaint   Patient presents with    Migraine     migraine- citric acid as a preserative

## 2019-05-29 NOTE — PATIENT INSTRUCTIONS
PRESCRIPTION REFILL POLICY [facility] Statement to Patients April 1, 2014 In an effort to ensure the large volume of patient prescription refills is processed in the most efficient and expeditious manner, we are asking our patients to assist us by calling your Pharmacy for all prescription refills, this will include also your  Mail Order Pharmacy. The pharmacy will contact our office electronically to continue the refill process. Please do not wait until the last minute to call your pharmacy. We need at least 48 hours (2days) to fill prescriptions. We also encourage you to call your pharmacy before going to  your prescription to make sure it is ready. With regard to controlled substance prescription refill requests (narcotic refills) that need to be picked up at our office, we ask your cooperation by providing us with at least 72 hours (3days) notice that you will need a refill. We will not refill narcotic prescription refill requests after 4:00pm on any weekday, Monday through Thursday, or after 2:00pm on Fridays, or on the weekends. We encourage everyone to explore another way of getting your prescription refill request processed using [employer], our patient web portal through our electronic medical record system. [employer] is an efficient and effective way to communicate your medication request directly to the office and  downloadable as an tiffanie on your smart phone . [employer] also features a review functionality that allows you to view your medication list as well as leave messages for your physician. Are you ready to get connected? If so please review the attatched instructions or speak to any of our staff to get you set up right away! Thank you so much for your cooperation. Should you have any questions please contact our Practice Administrator.  
 
The Physicians and Staff,  [facility] If you choose to go to imaging center outside of [facility], please be sure to bring imaging report and disc to follow up appointment. If we have ordered testing for you, know that; \"NO NEWS IS GOOD NEWS! \" It is our policy that we know longer call patients with results, nor do we  give test results over the phone. We schedule follow up appointments so that your results can be discussed in person. This allows you to address any questions you have regarding the results. If something of concern is revealed on your test, we will contact you to discuss the matter and if needed schedule a sooner follow up appointment. Additionally, results may be found by using the My Chart feature and one of our patient service representatives at the  can give you instructions on how to access this feature to utilize our electronic medical record system. Thank you for your understanding.

## 2019-05-29 NOTE — PROGRESS NOTES
[facility] and [address] at [facility] at [address] [place] [address], [address] [facility] [address] [facility], [address] [place] [address]  [phone] Office  [phone] Facsimile           Referring: Manuel Metcalf MD      Chief Complaint   Patient presents with    Migraine     migraine- citric acid as a preserative      78-year-old who presents today for evaluation of headache. 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 She says that diagnosis came about because she we will get spots in her vision and then she will typically have numbness of one side of her body. She is also had facial drooping. She has had episodes of aphasia with migraine. She has been given a multitude of abortive therapies in the past including Relpax and Imitrex and other triptan medications. We discussed hemiplegic migraine and the contraindication of the triptan's but she was unaware of such. In any regard she comes today because she has about 3-5 headaches per year. She notes that they typically are triggered by food and specifically triggered by the preservative citric acid. If she watches what she eats she does not get a headache. She notes that her headaches are entirely preventable by her behavior and she will sometimes accidentally eat something and she does not know the contents. In any regard 3-5/year tops. She went to the emergency department last week secondary to her refractory headache. She took 3 Relpax and 12 hours. She went to [facility] first and had a headache cocktail which was not effective. She then went to the emergency department at [facility] and had a headache cocktail. She had significant nausea and vomiting with this vomiting 4 times where she got to the hospital.  Had photophobia and phonophobia. No focal deficits with this 1.   She has not had any chest pain shortness of breath nausea or vomiting. She does endorse some anxiety. History reviewed. No pertinent past medical history. Past Surgical History:   Procedure Laterality Date    HX TONSILLECTOMY         Current Outpatient Medications   Medication Sig Dispense Refill    eletriptan (RELPAX) 40 mg tablet Take 40 mg by mouth once as needed. may repeat in 2 hours if necessary      doxycycline (MONODOX) 100 mg capsule Take 100 mg by mouth two (2) times a day.  NORETHINDRONE, CONTRACEPTIVE, PO Take  by mouth. Allergies   Allergen Reactions    Pcn [Penicillins] Rash       Social History     Tobacco Use    Smoking status: Never Smoker    Smokeless tobacco: Never Used   Substance Use Topics    Alcohol use: Not on file    Drug use: Not on file     History reviewed. No pertinent family history. Review of Systems  Pertinent positives and negatives as noted with remainder of comprehensive review negative    Examination  Visit Vitals  BP 98/80 (BP 1 Location: Left arm, BP Patient Position: Sitting)   Pulse 72   Temp 97.6 °F (36.4 °C) (Oral)   Resp 18   Ht 6' (1.829 m)   Wt 67.1 kg (148 lb)   LMP 05/10/2019   SpO2 100%   BMI 20.07 kg/m²     Pleasant, well appearing. Dress and grooming are appropriate. No scleral icterus is present. Oropharynx is clear. Supple neck without bruit appreciated. Heart regular. Pulses are symmetrical.  No edema in the lower extremities. Neurologically, she is awake, alert, and oriented with normal speech and language. Intact cranial nerves 2-12. No nystagmus. Visual fields full to confrontation. Disk margins are flat bilaterally. She has normal bulk and tone. She has no abnormal movement. She has no pronation or drift. She generates full strength in the upper and lower extremities to direct confrontational testing. Reflexes are symmetrical in the upper and lower extremities bilaterally. Her toes are down bilaterally. No Reyes.   Finger nose finger and rapid alternating movements are normal.  Steady gait. No sensory deficit to primary modalities. Impression/Plan  15-year-old young woman with migraine headaches infrequent only having 3-5/year as she indicates these are generally triggered by food as noted but without an effective abortive regimen. Additionally she describes a complicated/hemiplegic type migraine and in this case the triptan's are contraindicated. We discussed that today. Given that we will use Sprix 1 at headache onset repeat in 6 hours if needed. She will have follow-up scheduled for 3 months but if she is doing well she will call and stretch that out. Alessio Plasencia MD    This note was created using voice recognition software. Despite editing, there may be syntax errors. This note will not be viewable in [address] This note will not be viewable in [address]

## 2019-06-10 NOTE — PROGRESS NOTES
[address] [facility] Dara Sommers 58  Phone: [phone]  Fax: [phone]    Discharge Summary  2-15    Patient name: Ramirez Hunter  : 1990  Provider#: [id number]  Referral source: Manuel Metcalf MD      Medical/Treatment Diagnosis: Pain in right leg [M79.604]     Prior Hospitalization: see medical history     Comorbidities: See Plan of Care  Prior Level of Function:See Plan of Care  Medications: Verified on Patient Summary List    Start of Care: 18      Onset Date:2018   Visits from Start of Care: 19     Missed Visits: Multiple cancellations due to work conflict  Reporting Period : 18 to 19      ASSESSMENT/SUMMARY OF CARE: Ms. Peggy Linares was treated with a variety of PT interventions, including manual therapy and therapeutic exercise, over the course of 10 months to treat chronic R calf and foot pain. She ultimately did very well with PT with an achievement of all long term goals and a return to pain-free running. Short Term Goals: To be accomplished in 2 weeks:  Pt will demo ambulation x 15 minutes with no gait deviation and no pain present  Met. Pt will demo independence with HEP with no v.c. Needed  Met. Pt will demo full right ankle DF and PF to allow for ambulation without deviation  Met.     Long Term Goals: To be accomplished in 6-8 weeks:  1)Patient will demonstrate Grade IV or Grade V Hruska Adduction Lift Score to allow for functional mobility in home and community settings Met. 2)Pt will demonstrate the ability to ambulate forward and backward achieving bilateral Acetabular Femoral Internal Rotation for pain free mobility  Met. 3)Pt will demonstrate the ability to run and cycle without subjective complaints or gait deviation  Met. 4)Pt will demonstrate independence with discharge HEP to allow for ambulation, sitting and standing without objective dysfunction. Met.             RECOMMENDATIONS:  [x]Discontinue therapy: [x]Patient has reached or is progressing toward set goals      []Patient is non-compliant or has abdicated      []Due to lack of appreciable progress towards set goals      []Other    Mao Khanna, PT 6/10/2019

## 2019-06-11 NOTE — PROGRESS NOTES
PT DAILY TREATMENT NOTE - [facility] 2-15    Patient Name: Chris Adan  Date:2018  : 1990  [x]  Patient  Verified  Payor: BLUE CROSS / Plan: [facility] PPO / Product Type: PPO /    In time:10:05 AM  Out time:10:55 AM  Total Treatment Time (min): 50  Total Timed Codes (min): 30  1:1 Treatment Time ( only): 30   Visit #: 2     Treatment Area: Right leg pain [M79.604]    SUBJECTIVE  Pain Level (0-10 scale):0/10  Any medication changes, allergies to medications, adverse drug reactions, diagnosis change, or new procedure performed?: [x] No    [] Yes (see summary sheet for update)  Subjective functional status/changes:   [] No changes reported  Patient reports that she is performing underwater jogging on a TM for 15-20 several times a week. She also went for a 40 min walk with intermittent light jogging. She had some soreness along the lateral foot, but no significant pain.     OBJECTIVE    Modality rationale: decrease pain and increase tissue extensibility to improve the patients ability to walk without pain   Min Type Additional Details   10 [x] Estim: []Att   []Unatt        []TENS instruct                  []IFC  []Premod   []NMES                     [x]Other: asymmetrical, biphasic, 300 usec, 2 Hz []w/US   []w/ice   []w/heat  [x] w/ needles  Position: prone  Location: right lower leg    []  Traction: [] Cervical       []Lumbar                       [] Prone          []Supine                       []Intermittent   []Continuous Lbs:  [] before manual  [] after manual  []w/heat   10 [x]  Ultrasound: []Continuous   [x] Pulsed at:50%                           []1MHz   [x]3MHz Location: right lower leg  W/cm2:1.0    [] Paraffin         Location:   []w/heat    []  Ice     []  Heat  []  Ice massage Position:  Location:    []  Laser  []  Other: Position:  Location:      []  Vasopneumatic Device Pressure:       [] lo [] med [] hi   Temperature:      [x] Skin assessment post-treatment:  [x]intact pt took unknown amount of zoloft, buspar, and naproxen about two hours ago. Pt reports she just wants to go to sleep because she's getting evicted and lost her job and issues with ex girlfriend. []redness- no adverse reaction    []redness  adverse reaction:     10 min Therapeutic Exercise:  [x] See flow sheet :   Rationale: increase ROM, increase strength and improve coordination to improve the patients ability to walk without pain    20 min Manual Therapy:   DN was performed along the following musculature along the right lower leg:  Peroneus longus, 3 needles total at trigger points palpated throughout the muscle belly  Peroneus brevis, 2 needles  Peroneus tertius, 1 needle  Lateral gastrocnemius, 4 needles  50 mm x .30 mm needles were used throughout with insertion onto a bony end point and pistoning to elicit LTR's    DN was performed prior to the following manual therapy techniques to allow for improved ROM and decreased pain with walking:  Distraction gd III and IV right talocrural jt; fibular glides gd III and IV right distal jt; A/P talocrural jt mobs gd III and IV   Rationale: decrease pain, increase ROM, increase tissue extensibility and decrease trigger points to improve the patients ability to walk without pain          With   [] TE   [] TA   [] neuro   [] other: Patient Education: [x] Review HEP    [] Progressed/Changed HEP based on:   [] positioning   [] body mechanics   [] transfers   [] heat/ice application    [] other:      Other Objective/Functional Measures:   Several significant LTR's were elicited with DN along the peroneal longus muscle belly and lateral gastrocnemius head    Pain Level (0-10 scale) post treatment: 0    ASSESSMENT/Changes in Function:     Patient will continue to benefit from skilled PT services to modify and progress therapeutic interventions, address functional mobility deficits, address ROM deficits, address strength deficits, analyze and address soft tissue restrictions, analyze and cue movement patterns, analyze and modify body mechanics/ergonomics and assess and modify postural abnormalities to attain remaining goals.      []  See Plan of Care  []  See progress note/recertification  []  See Discharge Summary         Progress towards goals / Updated goals:  Patient tolerated today's introduction of DN to the lateral lower leg very well and will continue to utilize as needed with other traditional rehab methods to allow for a full return to running. PLAN  [x]  Upgrade activities as tolerated     [x]  Continue plan of care  []  Update interventions per flow sheet       []  Discharge due to:_  []  Other:_      Dillon Mckinnon PT , DPT, OCS, Cert.  DN   7/13/2018  11:56 AM

## 2019-06-25 ENCOUNTER — APPOINTMENT (OUTPATIENT)
Dept: PHYSICAL THERAPY | Age: 29
End: 2019-06-25

## 2019-07-09 ENCOUNTER — APPOINTMENT (OUTPATIENT)
Dept: PHYSICAL THERAPY | Age: 29
End: 2019-07-09

## 2019-07-16 NOTE — PROGRESS NOTES
[address] [facility] [address]  Phone: [phone]  Fax: [phone]    Discharge Summary  2-15    Patient name: Mariama Jeter  : 1990  Provider#: [id number]  Referral source: Rod Shelby MD      Medical/Treatment Diagnosis: Pain in right leg [M79.604]     Prior Hospitalization: see medical history     Comorbidities: See Plan of Care  Prior Level of Function:See Plan of Care  Medications: Verified on Patient Summary List    Start of Care: 18      Onset Date:May 2018   Visits from Start of Care: 19     Missed Visits: Multiple cancellations due to work conflicts  Reporting Period : 18 to 19      ASSESSMENT/SUMMARY OF CARE: Ms. Bhargavi Pavon did very well with a return to running with no limitations. She no longer requires PT services and has met all long term goals.       Short Term Goals: To be accomplished in 2 weeks:  Pt will demo ambulation x 15 minutes with no gait deviation and no pain present. Met. Pt will demo independence with HEP with no v.c. Needed   Met. Pt will demo full right ankle DF and PF to allow for ambulation without deviation   Met.     Long Term Goals: To be accomplished in 6-8 weeks:  1)Patient will demonstrate Grade IV or Grade V Hruska Adduction Lift Score to allow for functional mobility in home and community settings   Met. 2)Pt will demonstrate the ability to ambulate forward and backward achieving bilateral Acetabular Femoral Internal Rotation for pain free mobility   Met. 3)Pt will demonstrate the ability to run and cycle without subjective complaints or gait deviation   Met.   4)Pt will demonstrate independence with discharge HEP to allow for ambulation, sitting and standing without objective dysfunction    Met.             RECOMMENDATIONS:  [x]Discontinue therapy: [x]Patient has reached or is progressing toward set goals      []Patient is non-compliant or has abdicated      []Due to lack of appreciable progress towards set goals      []Other    Keith Alfred, PT , DPT, OCS, Cert.  DN   7/16/2019

## 2019-07-23 ENCOUNTER — APPOINTMENT (OUTPATIENT)
Dept: PHYSICAL THERAPY | Age: 29
End: 2019-07-23

## 2019-08-27 ENCOUNTER — HOSPITAL ENCOUNTER (OUTPATIENT)
Dept: PHYSICAL THERAPY | Age: 29
Discharge: HOME | End: 2019-08-27
Payer: COMMERCIAL

## 2019-08-27 PROCEDURE — 97140 MANUAL THERAPY 1/> REGIONS: CPT | Performed by: PHYSICAL THERAPY ASSISTANT

## 2019-08-27 PROCEDURE — 97014 ELECTRIC STIMULATION THERAPY: CPT | Performed by: PHYSICAL THERAPY ASSISTANT

## 2019-08-27 NOTE — PROGRESS NOTES
PT DAILY TREATMENT NOTE 2-15    Patient Name: Pino Ruby  Date:2019  : 1990  [x]  Patient  Verified  Payor: Ruperto Ho / Plan: [employer] [address] / Product Type: HMO /    In time:5:40 pm  Out time:6:20 pm  Total Treatment Time (min): 40  Visit #: 2    Treatment Area: Bilateral leg pain [M79.604, M79.605]    SUBJECTIVE  Pain Level (0-10 scale): 0-1/10  Any medication changes, allergies to medications, adverse drug reactions, diagnosis change, or new procedure performed?: [x] No    [] Yes (see summary sheet for update)  Subjective functional status/changes:   [] No changes reported  Pt states she felt great after last session and was able to run a 5k w/o pain and was 11 seconds off her TN. Pt states she ran and swam this morning and experienced very mild pain in the outside of her L foot.     OBJECTIVE    Modality rationale: decrease pain, increase tissue extensibility and increase muscle contraction/control to improve the patients ability to run   Min Type Additional Details      10' [x] Estim: []Att   [x]Unatt    []TENS instruct                  []IFC  []Premod   []NMES                     [x]Other:asymmetrical biphasic w/ needles, frequency 2 Hz, continuous  []w/US   []w/ice   []w/heat  Position: prone  Location: bilateral peroneal tendons (muscle belly on R, distal tendon on L)       []  Traction: [] Cervical       []Lumbar                       [] Prone          []Supine                       []Intermittent   []Continuous Lbs:  [] before manual  [] after manual  []w/heat    []  Ultrasound: []Continuous   [] Pulsed                       at: []1MHz   []3MHz Location:  W/cm2:    [] Paraffin         Location:   []w/heat    []  Ice     []  Heat  []  Ice massage Position:  Location:    []  Laser  []  Other: Position:  Location:      []  Vasopneumatic Device Pressure:       [] lo [] med [] hi   Temperature:      [x] Skin assessment post-treatment:  [x]intact []redness- no adverse reaction    []redness  adverse reaction:         30 min Manual Therapy: DN as noted in separate note below; STM to peroneals and lateral gastrocs   Rationale:      decrease pain, increase ROM, increase tissue extensibility and decrease trigger points to improve patient's ability to perform ADL's.     With MT   Patient Education:  YES  Reviewed HEP      Other Objective Measures:TTP over peroneals and lateral gastroc        Dry Needling Procedure Note     Dry Needle Session Number:  2        Procedure: An intramuscular manual therapy (dry needling) and a neuro-muscular re-education treatment was done to deactivate myofascial trigger points, with a solid filament needle, under aseptic technique.     Indication(s):          [] Muscle spasms  [] Headaches          [] TMJD                                          [] Muscle imbalances      [x] Myofascial pain & dysfunction                                [] Decreased ROM     TIMEOUT PERFORMED:    6:20 (enter time the timeout was completed)   Ramo Brandon (enter who was present)     Informed Consent Obtained:      [x] Verbal                 [x] Written     The following items were reviewed with the patient:  -Purpose of dry needling, side effects, possible complications, and the informed consent   -The need to report the use of blood thinners and/or immunosuppressant medications  -How to respond to possible adverse effects of the treatment  -Self treatment of post needling soreness: ice/heat, stretching, and activity modification.   -Opportunity was given to ask any questions, all questions were answered     Treatment:  The following muscles were treated today:  Magnus: peroneals and lateral gastroc, 50 mm x .3 mm (10 needles each LE)  Right:  Left:  *hemostasis applied after each needle was applied.    Soft tissue mobilization to above areas         Patients response:   [x]  LTRs              []  Muscle Relaxation                 [x]  Pain Relief    []  Decreased HAs [x]  Post-needling soreness        []  Increased ROM                       With   [] TE   [] TA   [] neuro   [] other: Patient Education: [x] Review HEP    [] Progressed/Changed HEP based on:   [] positioning   [] body mechanics   [] transfers   [] heat/ice application    [] other:      Other Objective/Functional Measures: NT     Pain Level (0-10 scale) post treatment: 0    ASSESSMENT/Changes in Function:   Pt had multiple twitch contractions in both her peroneals and lateral gastrocs. Pt is making good progress w/ PT. Patient will continue to benefit from skilled PT services to modify and progress therapeutic interventions, address functional mobility deficits, address ROM deficits, address strength deficits, analyze and address soft tissue restrictions, analyze and cue movement patterns and analyze and modify body mechanics/ergonomics to attain remaining goals.      []  See Plan of Care  []  See progress note/recertification  []  See Discharge Summary         Progress towards goals / Updated goals:  NT    PLAN  [x]  Upgrade activities as tolerated     []  Continue plan of care  []  Update interventions per flow sheet       []  Discharge due to:_  []  Other:_      Tatiana Cook, PT, DPT, OCS 8/27/2019

## 2019-09-10 ENCOUNTER — HOSPITAL ENCOUNTER (OUTPATIENT)
Dept: PHYSICAL THERAPY | Age: 29
Discharge: HOME | End: 2019-09-10
Payer: COMMERCIAL

## 2019-09-10 PROCEDURE — 97014 ELECTRIC STIMULATION THERAPY: CPT | Performed by: PHYSICAL THERAPY ASSISTANT

## 2019-09-10 PROCEDURE — 97140 MANUAL THERAPY 1/> REGIONS: CPT | Performed by: PHYSICAL THERAPY ASSISTANT

## 2019-09-10 NOTE — PROGRESS NOTES
PT DAILY TREATMENT NOTE 2-15    Patient Name: Tiarra Thomas  Date:9/10/2019  : 1990  [x]  Patient  Verified  Payor: Robyn Matias / Plan: [employer] / Product Type: HMO /    In time:5:30 pm  Out time:6:10 pm  Total Treatment Time (min): 40  Visit #: 2    Treatment Area: Bilateral leg pain [M79.604, M79.605]    SUBJECTIVE  Pain Level (0-10 scale): 0-1/10  Any medication changes, allergies to medications, adverse drug reactions, diagnosis change, or new procedure performed?: [x] No    [] Yes (see summary sheet for update)  Subjective functional status/changes:   [] No changes reported  Pt states she is doing well w/ her training but her L foot flared up during her run over the weekend.     OBJECTIVE    Modality rationale: decrease pain, increase tissue extensibility and increase muscle contraction/control to improve the patients ability to run   Min Type Additional Details      10' [x] Estim: []Att   [x]Unatt    []TENS instruct                  []IFC  []Premod   []NMES                     [x]Other:asymmetrical biphasic w/ needles, frequency 2 Hz, continuous  []w/US   []w/ice   []w/heat  Position: prone  Location: bilateral peroneal tendons (muscle belly on R, distal tendon on L)       []  Traction: [] Cervical       []Lumbar                       [] Prone          []Supine                       []Intermittent   []Continuous Lbs:  [] before manual  [] after manual  []w/heat    []  Ultrasound: []Continuous   [] Pulsed                       at: []1MHz   []3MHz Location:  W/cm2:    [] Paraffin         Location:   []w/heat    []  Ice     []  Heat  []  Ice massage Position:  Location:    []  Laser  []  Other: Position:  Location:      []  Vasopneumatic Device Pressure:       [] lo [] med [] hi   Temperature:      [x] Skin assessment post-treatment:  [x]intact []redness- no adverse reaction    []redness  adverse reaction:         30 min Manual Therapy: DN as noted in separate note below; STM to peroneals and lateral gastrocs   Rationale:      decrease pain, increase ROM, increase tissue extensibility and decrease trigger points to improve patient's ability to perform ADL's.     With MT   Patient Education:  YES  Reviewed HEP      Other Objective Measures:TTP over peroneals and lateral gastroc        Dry Needling Procedure Note     Dry Needle Session Number:  3        Procedure: An intramuscular manual therapy (dry needling) and a neuro-muscular re-education treatment was done to deactivate myofascial trigger points, with a solid filament needle, under aseptic technique.     Indication(s):          [] Muscle spasms  [] Headaches          [] TMJD                                          [] Muscle imbalances      [x] Myofascial pain & dysfunction                                [] Decreased ROM     TIMEOUT PERFORMED:    6:10 (enter time the timeout was completed)   Mirta Schulz (enter who was present)     Informed Consent Obtained:      [x] Verbal                 [x] Written     The following items were reviewed with the patient:  -Purpose of dry needling, side effects, possible complications, and the informed consent   -The need to report the use of blood thinners and/or immunosuppressant medications  -How to respond to possible adverse effects of the treatment  -Self treatment of post needling soreness: ice/heat, stretching, and activity modification.   -Opportunity was given to ask any questions, all questions were answered     Treatment:  The following muscles were treated today:  Magnus: peroneals and lateral gastroc, 50 mm x .3 mm (10 needles each LE)  Right:  Left:  *hemostasis applied after each needle was applied.    Soft tissue mobilization to above areas         Patients response:   [x]  LTRs              []  Muscle Relaxation                 [x]  Pain Relief    []  Decreased HAs         [x]  Post-needling soreness        []  Increased ROM                       With   [] TE   [] TA   [] neuro   [] other: Patient Education: [x] Review HEP    [] Progressed/Changed HEP based on:   [] positioning   [] body mechanics   [] transfers   [] heat/ice application    [] other:      Other Objective/Functional Measures: NT     Pain Level (0-10 scale) post treatment: 0    ASSESSMENT/Changes in Function:   Pt had multiple twitch contractions in both her peroneals and lateral gastrocs. Pt is making good progress w/ PT. Patient will continue to benefit from skilled PT services to modify and progress therapeutic interventions, address functional mobility deficits, address ROM deficits, address strength deficits, analyze and address soft tissue restrictions, analyze and cue movement patterns and analyze and modify body mechanics/ergonomics to attain remaining goals.      []  See Plan of Care  []  See progress note/recertification  []  See Discharge Summary         Progress towards goals / Updated goals:  NT    PLAN  [x]  Upgrade activities as tolerated     []  Continue plan of care  []  Update interventions per flow sheet       []  Discharge due to:_  []  Other:_      Cherylene Donald, PT, DPT, OCS 9/10/2019

## 2019-09-17 ENCOUNTER — HOSPITAL ENCOUNTER (OUTPATIENT)
Dept: PHYSICAL THERAPY | Age: 29
Discharge: HOME | End: 2019-09-17
Payer: COMMERCIAL

## 2019-09-17 PROCEDURE — 97014 ELECTRIC STIMULATION THERAPY: CPT | Performed by: PHYSICAL THERAPY ASSISTANT

## 2019-09-17 PROCEDURE — 97140 MANUAL THERAPY 1/> REGIONS: CPT | Performed by: PHYSICAL THERAPY ASSISTANT

## 2019-09-17 NOTE — PROGRESS NOTES
PT DAILY TREATMENT NOTE 2-15    Patient Name: Zo Burnham  Date:2019  : 1990  [x]  Patient  Verified  Payor: Zoila Briceno / Plan: VICTORINA Zaidi 33 / Product Type: HMO /    In time:6:00 pm  Out time:6:40 pm  Total Treatment Time (min): 40  Visit #: 4    Treatment Area: Bilateral leg pain [M79.604, M79.605]    SUBJECTIVE  Pain Level (0-10 scale): 0-1/10  Any medication changes, allergies to medications, adverse drug reactions, diagnosis change, or new procedure performed?: [x] No    [] Yes (see summary sheet for update)  Subjective functional status/changes:   [] No changes reported  Pt states she ran today and only experienced mild pain in the outside of her R foot.  Pt had no issues w/ her L.    OBJECTIVE    Modality rationale: decrease pain, increase tissue extensibility and increase muscle contraction/control to improve the patients ability to run   Min Type Additional Details      10' [x] Estim: []Att   [x]Unatt    []TENS instruct                  []IFC  []Premod   []NMES                     [x]Other:asymmetrical biphasic w/ needles, frequency 2 Hz, continuous  []w/US   []w/ice   []w/heat  Position: prone  Location: bilateral peroneal tendons (muscle belly on R, distal tendon on L)       []  Traction: [] Cervical       []Lumbar                       [] Prone          []Supine                       []Intermittent   []Continuous Lbs:  [] before manual  [] after manual  []w/heat    []  Ultrasound: []Continuous   [] Pulsed                       at: []1MHz   []3MHz Location:  W/cm2:    [] Paraffin         Location:   []w/heat    []  Ice     []  Heat  []  Ice massage Position:  Location:    []  Laser  []  Other: Position:  Location:      []  Vasopneumatic Device Pressure:       [] lo [] med [] hi   Temperature:      [x] Skin assessment post-treatment:  [x]intact []redness- no adverse reaction    []redness  adverse reaction:         30 min Manual Therapy: DN as noted in separate note below; STM to peroneals and lateral gastrocs   Rationale:      decrease pain, increase ROM, increase tissue extensibility and decrease trigger points to improve patient's ability to perform ADL's.     With MT   Patient Education:  YES  Reviewed HEP      Other Objective Measures:TTP over peroneals and lateral gastroc        Dry Needling Procedure Note     Dry Needle Session Number:  4        Procedure: An intramuscular manual therapy (dry needling) and a neuro-muscular re-education treatment was done to deactivate myofascial trigger points, with a solid filament needle, under aseptic technique.     Indication(s):          [] Muscle spasms  [] Headaches          [] TMJD                                          [] Muscle imbalances      [x] Myofascial pain & dysfunction                                [] Decreased ROM     TIMEOUT PERFORMED:    6:40 (enter time the timeout was completed)   [facility] (enter who was present)     Informed Consent Obtained:      [x] Verbal                 [x] Written     The following items were reviewed with the patient:  -Purpose of dry needling, side effects, possible complications, and the informed consent   -The need to report the use of blood thinners and/or immunosuppressant medications  -How to respond to possible adverse effects of the treatment  -Self treatment of post needling soreness: ice/heat, stretching, and activity modification.   -Opportunity was given to ask any questions, all questions were answered     Treatment:  The following muscles were treated today:  Magnus: peroneals and lateral gastroc, 50 mm x .3 mm (10 needles each LE)  Right:  Left:  *hemostasis applied after each needle was applied.    Soft tissue mobilization to above areas         Patients response:   [x]  LTRs              []  Muscle Relaxation                 [x]  Pain Relief    []  Decreased HAs         [x]  Post-needling soreness        []  Increased ROM                       With   [] TE   [] TA   [] neuro   [] other: Patient Education: [x] Review HEP    [] Progressed/Changed HEP based on:   [] positioning   [] body mechanics   [] transfers   [] heat/ice application    [] other:      Other Objective/Functional Measures: NT     Pain Level (0-10 scale) post treatment: 0    ASSESSMENT/Changes in Function:   Pt will compete in [employer] next weekend and then will continue to be treated after that. Patient will continue to benefit from skilled PT services to modify and progress therapeutic interventions, address functional mobility deficits, address ROM deficits, address strength deficits, analyze and address soft tissue restrictions, analyze and cue movement patterns and analyze and modify body mechanics/ergonomics to attain remaining goals.      []  See Plan of Care  []  See progress note/recertification  []  See Discharge Summary         Progress towards goals / Updated goals:  NT    PLAN  [x]  Upgrade activities as tolerated     []  Continue plan of care  []  Update interventions per flow sheet       []  Discharge due to:_  []  Other:_      Nellie Joe PT, DPT, OCS 9/17/2019

## 2019-09-24 ENCOUNTER — APPOINTMENT (OUTPATIENT)
Dept: PHYSICAL THERAPY | Age: 29
End: 2019-09-24
Payer: COMMERCIAL

## 2019-10-01 ENCOUNTER — HOSPITAL ENCOUNTER (OUTPATIENT)
Dept: PHYSICAL THERAPY | Age: 29
Discharge: HOME | End: 2019-10-01
Payer: COMMERCIAL

## 2019-10-01 PROCEDURE — 97014 ELECTRIC STIMULATION THERAPY: CPT | Performed by: PHYSICAL THERAPIST

## 2019-10-01 PROCEDURE — 97140 MANUAL THERAPY 1/> REGIONS: CPT | Performed by: PHYSICAL THERAPIST

## 2019-10-01 NOTE — PROGRESS NOTES
PT DAILY TREATMENT NOTE 2-15    Patient Name: Samuel Grayson  Date:10/1/2019  : 1990  [x]  Patient  Verified  Payor: Jade Irizarry / Plan: VICTORINA Zaidi 33 / Product Type: HMO /    In time:5:40 PM  Out time:6:20 PM  Total Treatment Time (min): 40  Visit #: 5    Treatment Area: Bilateral leg pain [M79.604, M79.605]    SUBJECTIVE  Pain Level (0-10 scale): 1/10  Any medication changes, allergies to medications, adverse drug reactions, diagnosis change, or new procedure performed?: [x] No    [] Yes (see summary sheet for update)  Subjective functional status/changes:   [] No changes reported  Patient ran her [employer] race this past weekend and did well, however she was unable to finish due to dehydradtion. Ultimately, she doesn't feel like her lower leg issues prevented her from finishing the race, so she is very happy about that. She had increased pain along the lateral L calf and R peroneal tendon, but not any more than she anticipated considering where she was one month ago.     OBJECTIVE    Modality rationale: decrease pain, increase tissue extensibility and increase muscle contraction/control to improve the patients ability to run   Min Type Additional Details      10' [x] Estim: []Att   [x]Unatt    []TENS instruct                  []IFC  []Premod   []NMES                     [x]Other:asymmetrical biphasic w/ needles, frequency 2 Hz, continuous  []w/US   []w/ice   []w/heat  Position: prone  Location: bilateral peroneal tendons (muscle belly on R, distal tendon on L)       []  Traction: [] Cervical       []Lumbar                       [] Prone          []Supine                       []Intermittent   []Continuous Lbs:  [] before manual  [] after manual  []w/heat    []  Ultrasound: []Continuous   [] Pulsed                       at: []1MHz   []3MHz Location:  W/cm2:    [] Paraffin         Location:   []w/heat    []  Ice     []  Heat  []  Ice massage Position:  Location:    []  Laser  []  Other: Position:  Location:      []  Vasopneumatic Device Pressure:       [] lo [] med [] hi   Temperature:      [x] Skin assessment post-treatment:  [x]intact []redness- no adverse reaction    []redness - adverse reaction:         30 min Manual Therapy: DN as noted in separate note below; STM to peroneals and lateral gastrocs   Rationale:      decrease pain, increase ROM, increase tissue extensibility and decrease trigger points to improve patient's ability to perform ADL's.     With MT   Patient Education:  YES  Reviewed HEP      Other Objective Measures:TTP over peroneals and lateral gastroc        Dry Needling Procedure Note     Dry Needle Session Number:  5        Procedure: An intramuscular manual therapy (dry needling) and a neuro-muscular re-education treatment was done to deactivate myofascial trigger points, with a solid filament needle, under aseptic technique.     Indication(s):          [] Muscle spasms  [] Headaches          [] TMJD                                          [] Muscle imbalances      [x] Myofascial pain & dysfunction                                [] Decreased ROM     TIMEOUT PERFORMED:    6:20 (enter time the timeout was completed)   [facility] (enter who was present)     Informed Consent Obtained:      [x] Verbal                 [x] Written     The following items were reviewed with the patient:  -Purpose of dry needling, side effects, possible complications, and the informed consent   -The need to report the use of blood thinners and/or immunosuppressant medications  -How to respond to possible adverse effects of the treatment  -Self treatment of post needling soreness: ice/heat, stretching, and activity modification.   -Opportunity was given to ask any questions, all questions were answered     Treatment:  The following muscles were treated today:  Magnus: peroneals and lateral gastroc, 50 mm x .3 mm (10 needles each LE)  Right:  Left:  *hemostasis applied after each needle was applied.    Soft tissue mobilization to above areas         Patients response:   [x]  LTRs              []  Muscle Relaxation                 [x]  Pain Relief    []  Decreased HAs         [x]  Post-needling soreness        []  Increased ROM                       With   [] TE   [] TA   [] neuro   [] other: Patient Education: [x] Review HEP    [] Progressed/Changed HEP based on:   [] positioning   [] body mechanics   [] transfers   [] heat/ice application    [] other:      Other Objective/Functional Measures: NT     Pain Level (0-10 scale) post treatment: 0    ASSESSMENT/Changes in Function:   Patient is now demonstrating improved gait mechanics and decreased pain with running. Patient will continue to benefit from skilled PT services to modify and progress therapeutic interventions, address functional mobility deficits, address ROM deficits, address strength deficits, analyze and address soft tissue restrictions, analyze and cue movement patterns and analyze and modify body mechanics/ergonomics to attain remaining goals. []  See Plan of Care  []  See progress note/recertification  []  See Discharge Summary         Progress towards goals / Updated goals:  Patient was able to participate in her [employer] race without limitations from the ankle and will look to discharge soon within the next 1-2 visits.     PLAN  [x]  Upgrade activities as tolerated     []  Continue plan of care  []  Update interventions per flow sheet       []  Discharge due to:_  []  Other:_      Mercy Oneill, PT, PT, DPT, OCS 10/1/2019

## 2019-10-08 ENCOUNTER — APPOINTMENT (OUTPATIENT)
Dept: PHYSICAL THERAPY | Age: 29
End: 2019-10-08
Payer: COMMERCIAL

## 2019-10-22 ENCOUNTER — APPOINTMENT (OUTPATIENT)
Dept: PHYSICAL THERAPY | Age: 29
End: 2019-10-22
Payer: COMMERCIAL

## 2019-11-05 ENCOUNTER — APPOINTMENT (OUTPATIENT)
Dept: PHYSICAL THERAPY | Age: 29
End: 2019-11-05

## 2019-11-19 ENCOUNTER — APPOINTMENT (OUTPATIENT)
Dept: PHYSICAL THERAPY | Age: 29
End: 2019-11-19

## 2020-01-07 NOTE — PROGRESS NOTES
[address] [place] [address] Dara Sommers 58  Phone: [phone]  Fax: [phone]    Discharge Summary  2-15    Patient name: Naila Hopper  : 1990  Provider#: [id number]  Referral source: Kassidy Seaman MD      Medical/Treatment Diagnosis: Bilateral leg pain [M79.604, M79.605]     Prior Hospitalization: see medical history     Comorbidities: See Plan of Care  Prior Level of Function:See Plan of Care  Medications: Verified on Patient Summary List    Start of Care: 19      Onset Date:few months prior   Visits from Start of Care: 5     Missed Visits: 2  Reporting Period : 19 to 10/1/19      ASSESSMENT/SUMMARY OF CARE: Pt made good progress w/ PT and dry needling. Pt was able compete in [employer] w/o being limited by LE's and is now working on improving her gait and running mechanics. Pt has been educated on updated HEP and returning to MD if questions arise. Short Term Goals: To be accomplished in 3 treatments:  Pt will be independent w/ HEP- MET  Pt will report 30% less pain when running/training- MET   Long Term Goals:  To be accomplished in 6 treatments:  Pt will report over 10 point improvement on FOTO- MET  Pt will be able to complete running portion of triathlon- MET     RECOMMENDATIONS:  [x]Discontinue therapy: [x]Patient has reached or is progressing toward set goals      []Patient is non-compliant or has abdicated      []Due to lack of appreciable progress towards set goals      []Other    Josh [place], PT, DPT, OCS 2020

## 2021-09-11 NOTE — PROGRESS NOTES
2.21 PT DAILY TREATMENT NOTE 2-15    Patient Name: Marcelina   Date:2017  : 1990  [x]  Patient  Verified  Payor: [employer] / Plan: Ashok Vergara [id number] PPO / Product Type: PPO /    In time: 7:30 am  Out time: 8:30 am  Total Treatment Time (min): 60  Visit #: 6     Treatment Area: Right hip pain [M25.551]    SUBJECTIVE  Pain Level (0-10 scale): 7/10 knee and hip pain present  Any medication changes, allergies to medications, adverse drug reactions, diagnosis change, or new procedure performed?: [x] No    [] Yes (see summary sheet for update)  Subjective functional status/changes:   [] No changes reported  Pt reports that she tried to run a half marathon race and that she was in a lot of pain and was having a lot of symptoms. OBJECTIVE    60 min Neuromuscular Re-education:  [x]  See flow sheet :   Rationale: increase ROM, increase strength, improve coordination, improve balance and increase proprioception  to improve the patients ability to ambulate and jog without pain present.           With   [] TE   [] TA   [x] neuro   [] other: Patient Education: [x] Review HEP    [x] Progressed/Changed HEP based on:   [] positioning   [] body mechanics   [] transfers   [] heat/ice application    [] other:      Other Objective/Functional Measures:   + HGIR bilaterally  + adduction drop test  + pelvic floor drop test bilaterally R > L  HadLT R Level 1+, L  Level 2     Pain Level (0-10 scale) post treatment: 3-4/10    ASSESSMENT/Changes in Function:     Patient will continue to benefit from skilled PT services to modify and progress therapeutic interventions, address functional mobility deficits, address ROM deficits, address strength deficits, analyze and address soft tissue restrictions, analyze and cue movement patterns, analyze and modify body mechanics/ergonomics, assess and modify postural abnormalities, address imbalance/dizziness and instruct in home and community integration to attain remaining goals.     []  See Plan of Care  []  See progress note/recertification  []  See Discharge Summary         Progress towards goals / Updated goals:  Pt did not tolerate IADL skills well with hip and IT band pain present.     PLAN  [x]  Upgrade activities as tolerated     [x]  Continue plan of care  [x]  Update interventions per flow sheet       []  Discharge due to:_  []  Other:_        Chavasa Current, PT, DPT, [facility]  9/5/2017  10:30 AM

## 2024-04-23 ENCOUNTER — HOSPITAL ENCOUNTER (OUTPATIENT)
Facility: HOSPITAL | Age: 34
Setting detail: RECURRING SERIES
Discharge: HOME | End: 2024-04-26
Payer: COMMERCIAL

## 2024-04-23 PROCEDURE — 97535 SELF CARE MNGMENT TRAINING: CPT | Performed by: PHYSICAL THERAPIST

## 2024-04-23 PROCEDURE — 97112 NEUROMUSCULAR REEDUCATION: CPT | Performed by: PHYSICAL THERAPIST

## 2024-04-23 PROCEDURE — 97161 PT EVAL LOW COMPLEX 20 MIN: CPT | Performed by: PHYSICAL THERAPIST

## 2024-05-01 ENCOUNTER — HOSPITAL ENCOUNTER (OUTPATIENT)
Facility: HOSPITAL | Age: 34
Setting detail: RECURRING SERIES
Discharge: HOME | End: 2024-05-04
Payer: COMMERCIAL

## 2024-05-01 PROCEDURE — 97112 NEUROMUSCULAR REEDUCATION: CPT | Performed by: PHYSICAL THERAPIST

## 2024-05-01 NOTE — PROGRESS NOTES
PHYSICAL THERAPY - DAILY TREATMENT NOTE (updated 3/23)      Date: 2024          Patient Name:  Coretta Casiano :  1990   Medical   Diagnosis:  Right knee pain [M25.561] Treatment Diagnosis:  M25.561  RIGHT KNEE PAIN    Referral Source:  Binu Sutton MD Insurance:   Payor: BC / Plan: BCBS OUT OF STATE / Product Type: *No Product type* /                     Patient  verified yes     Visit #   Current  / Total 2 14   Time   In / Out 1147 A 1226 P   Total Treatment Time 39   Total Timed Codes 39         SUBJECTIVE    Pain Level (0-10 scale): 0/10 a rest; 3-4/10 at worst    Any medication changes, allergies to medications, adverse drug reactions, diagnosis change, or new procedure performed?: [x] No    [] Yes (see summary sheet for update)  Medications: Verified on Patient Summary List    Subjective functional status/changes:     Pt reports that basic walking and basic mobility are no longer painful. She has been taking oral NSAIDS and icing daily. Her HEP has gone well. She did start walking and has been able to walk largely without pain. She has not tried running and not tried LE strength training or cycling. She is in a lot less pain overall.     OBJECTIVE      Therapeutic Procedures:  Tx Min Billable or 1:1 Min (if diff from Tx Min) Procedure, Rationale, Specifics   39  [id number] Neuromuscular Re-Education (timed):  improve balance, coordination, kinesthetic sense, posture, core stability and proprioception to improve patient's ability to develop conscious control of individual muscles and awareness of position of extremities in order to progress to PLOF and address remaining functional goals. (see flow sheet as applicable)     Details if applicable:          39     Total Total                  [x]  Patient Education billed concurrently with other procedures   [x] Review HEP    [] Progressed/Changed HEP, detail:    [] Other detail:         Other Objective/Functional Measures  + PADT

## 2024-05-17 ENCOUNTER — HOSPITAL ENCOUNTER (OUTPATIENT)
Facility: HOSPITAL | Age: 34
Setting detail: RECURRING SERIES
Discharge: HOME | End: 2024-05-20
Payer: COMMERCIAL

## 2024-05-17 PROCEDURE — 97112 NEUROMUSCULAR REEDUCATION: CPT | Performed by: PHYSICAL THERAPIST

## 2024-05-17 NOTE — PROGRESS NOTES
PHYSICAL THERAPY - DAILY TREATMENT NOTE (updated 3/23)      Date: 2024          Patient Name:  Coretta Casiano :  1990   Medical   Diagnosis:  Right knee pain [M25.561] Treatment Diagnosis:  M25.561  RIGHT KNEE PAIN    Referral Source:  Binu Sutton MD Insurance:   Payor: [employer] / Plan: BCBS OUT OF STATE / Product Type: *No Product type* /                     Patient  verified yes     Visit #   Current  / Total 3 14   Time   In / Out 837 A 920 A   Total Treatment Time 43   Total Timed Codes 43         SUBJECTIVE    Pain Level (0-10 scale): 0/10 a rest; 5/10 at worst    Any medication changes, allergies to medications, adverse drug reactions, diagnosis change, or new procedure performed?: [x] No    [] Yes (see summary sheet for update)  Medications: Verified on Patient Summary List    Subjective functional status/changes:     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 She did have some knee pain following. She went on a longer hike with her son and child and did some intermittent running with knee pn that occurred.    OBJECTIVE      Therapeutic Procedures:  Tx Min Billable or 1:1 Min (if diff from Tx Min) Procedure, Rationale, Specifics   43  11003 Neuromuscular Re-Education (timed):  improve balance, coordination, kinesthetic sense, posture, core stability and proprioception to improve patient's ability to develop conscious control of individual muscles and awareness of position of extremities in order to progress to PLOF and address remaining functional goals. (see flow sheet as applicable)     Details if applicable:          43     Total Total                  [x]  Patient Education billed concurrently with other procedures   [x] Review HEP    [] Progressed/Changed HEP, detail:    [] Other detail:         Other Objective/Functional Measures  + PADT L  Forward bend to toes with HS sensation present  Level 3+ squat with no knee pn  - HGIR bilaterally -

## 2024-05-20 ENCOUNTER — APPOINTMENT (OUTPATIENT)
Facility: HOSPITAL | Age: 34
End: 2024-05-20
Payer: COMMERCIAL

## 2024-05-29 ENCOUNTER — HOSPITAL ENCOUNTER (OUTPATIENT)
Facility: HOSPITAL | Age: 34
Setting detail: RECURRING SERIES
Discharge: HOME | End: 2024-06-01
Payer: COMMERCIAL

## 2024-05-29 PROCEDURE — 97112 NEUROMUSCULAR REEDUCATION: CPT

## 2024-05-29 NOTE — PROGRESS NOTES
PHYSICAL THERAPY - DAILY TREATMENT NOTE (updated 3/23)      Date: 2024          Patient Name:  Coretta Casiano :  1990   Medical   Diagnosis:  Right knee pain [M25.561] Treatment Diagnosis:  M25.561  RIGHT KNEE PAIN    Referral Source:  Binu Sutton MD Insurance:   Payor: [employer] / Plan: BCBS OUT OF STATE / Product Type: *No Product type* /                     Patient  verified yes     Visit #   Current  / Total 4 14   Time   In / Out 745 A 830 A   Total Treatment Time 45   Total Timed Codes 45         SUBJECTIVE    Pain Level (0-10 scale): 0/10 a rest; 5/10 at worst    Any medication changes, allergies to medications, adverse drug reactions, diagnosis change, or new procedure performed?: [x] No    [] Yes (see summary sheet for update)  Medications: Verified on Patient Summary List    Subjective functional status/changes:     Pt reports that she walked for 90 minutes this morning and the L knee started bothering her and after the R knee is a little bothersome but nothing bad. [place], there was some knee pain but nothing that lingered.    OBJECTIVE      Therapeutic Procedures:  Tx Min Billable or 1:1 Min (if diff from Tx Min) Procedure, Rationale, Specifics   45  51860 Neuromuscular Re-Education (timed):  improve balance, coordination, kinesthetic sense, posture, core stability and proprioception to improve patient's ability to develop conscious control of individual muscles and awareness of position of extremities in order to progress to PLOF and address remaining functional goals. (see flow sheet as applicable)     Details if applicable:          45     Total Total                  [x]  Patient Education billed concurrently with other procedures   [x] Review HEP    [] Progressed/Changed HEP, detail:    [] Other detail:         Other Objective/Functional Measures  HAdDT: + B  PADT: + B  HGIR: - B  Horizontal Abd:

## 2024-06-07 ENCOUNTER — HOSPITAL ENCOUNTER (OUTPATIENT)
Facility: HOSPITAL | Age: 34
Setting detail: RECURRING SERIES
Discharge: HOME | End: 2024-06-10
Payer: COMMERCIAL

## 2024-06-07 PROCEDURE — 97112 NEUROMUSCULAR REEDUCATION: CPT | Performed by: PHYSICAL THERAPIST

## 2024-06-07 NOTE — PROGRESS NOTES
PHYSICAL THERAPY - DAILY TREATMENT NOTE (updated 3/23)      Date: 2024          Patient Name:  Coretta Casiano :  1990   Medical   Diagnosis:  Right knee pain [M25.561] Treatment Diagnosis:  M25.561  RIGHT KNEE PAIN    Referral Source:  Binu Sutton MD Insurance:   Payor: [employer] / Plan: BCBS OUT OF STATE / Product Type: *No Product type* /                     Patient  verified yes     Visit #   Current  / Total 5 14   Time   In / Out 1008 A 1059 A   Total Treatment Time 51   Total Timed Codes 51         SUBJECTIVE    Pain Level (0-10 scale): 1-2/10 a rest; 4/10 at worst during run; will get down to 2/10    Any medication changes, allergies to medications, adverse drug reactions, diagnosis change, or new procedure performed?: [x] No    [] Yes (see summary sheet for update)  Medications: Verified on Patient Summary List    Subjective functional status/changes:     Pt reports that she was able to run for 13 minutes and 17 minutes without a lot of pn in her knee. It did start out sort of sore and it reduced quickly.    OBJECTIVE      Therapeutic Procedures:  Tx Min Billable or 1:1 Min (if diff from Tx Min) Procedure, Rationale, Specifics   51  52333 Neuromuscular Re-Education (timed):  improve balance, coordination, kinesthetic sense, posture, core stability and proprioception to improve patient's ability to develop conscious control of individual muscles and awareness of position of extremities in order to progress to PLOF and address remaining functional goals. (see flow sheet as applicable)     Details if applicable:          51     Total Total                  [x]  Patient Education billed concurrently with other procedures   [x] Review HEP    [] Progressed/Changed HEP, detail:    [] Other detail:         Other Objective/Functional Measures  HAdDT: - B  PADT: + B  HGIR: - B  Horizontal Abd: WNL - NT  HAdLT: 3/5 pre 4/5 post  Functional Squat: 4/5   Mod v.c. for sitting position to improve ZOA

## 2024-06-14 ENCOUNTER — HOSPITAL ENCOUNTER (OUTPATIENT)
Facility: HOSPITAL | Age: 34
Setting detail: RECURRING SERIES
Discharge: HOME | End: 2024-06-17
Payer: COMMERCIAL

## 2024-06-14 PROCEDURE — 97112 NEUROMUSCULAR REEDUCATION: CPT | Performed by: PHYSICAL THERAPIST

## 2024-06-14 NOTE — PROGRESS NOTES
PHYSICAL THERAPY - DAILY TREATMENT NOTE (updated 3/23)      Date: 2024          Patient Name:  Coretta Casiano :  1990   Medical   Diagnosis:  Right knee pain [M25.561] Treatment Diagnosis:  M25.561  RIGHT KNEE PAIN    Referral Source:  Binu Sutton MD Insurance:   Payor: Fl[employer] / Plan: BCBS OUT OF STATE / Product Type: *No Product type* /                     Patient  verified yes     Visit #   Current  / Total 6 14   Time   In / Out 1010 A 1052 A   Total Treatment Time 42   Total Timed Codes 42         SUBJECTIVE    Pain Level (0-10 scale): 1/10 a rest    Any medication changes, allergies to medications, adverse drug reactions, diagnosis change, or new procedure performed?: [x] No    [] Yes (see summary sheet for update)  Medications: Verified on Patient Summary List    Subjective functional status/changes:     Pt was able to run for 20 minutes. She has not been sleeping well the last several weeks. She is only getting a few hours of sleep and this is being investigated by her PCP. She is also going to be seeing a rheumatolgist to investigate her celiac disease further. She is not taking a SSRI or a SNRI.  She did have some knee pn on R lat knee 1x when exercising but she stopped and the pain went away.    OBJECTIVE      Therapeutic Procedures:  Tx Min Billable or 1:1 Min (if diff from Tx Min) Procedure, Rationale, Specifics   42  59321 Neuromuscular Re-Education (timed):  improve balance, coordination, kinesthetic sense, posture, core stability and proprioception to improve patient's ability to develop conscious control of individual muscles and awareness of position of extremities in order to progress to PLOF and address remaining functional goals. (see flow sheet as applicable)     Details if applicable:          42     Total Total                  [x]  Patient Education billed concurrently with other procedures   [x] Review HEP    [] Progressed/Changed HEP, detail:    [] Other detail:

## 2024-07-01 ENCOUNTER — APPOINTMENT (OUTPATIENT)
Facility: HOSPITAL | Age: 34
End: 2024-07-01
Payer: COMMERCIAL

## 2024-07-19 ENCOUNTER — HOSPITAL ENCOUNTER (OUTPATIENT)
Facility: HOSPITAL | Age: 34
Setting detail: RECURRING SERIES
Discharge: HOME | End: 2024-07-22
Payer: COMMERCIAL

## 2024-07-19 PROCEDURE — 97112 NEUROMUSCULAR REEDUCATION: CPT | Performed by: PHYSICAL THERAPIST

## 2024-07-19 NOTE — PROGRESS NOTES
PHYSICAL THERAPY - DAILY TREATMENT NOTE (updated 3/23)      Date: 2024          Patient Name:  Coretta Casiano :  1990   Medical   Diagnosis:  Right knee pain [M25.561] Treatment Diagnosis:  M25.561  RIGHT KNEE PAIN    Referral Source:  Binu Sutton MD Insurance:   Payor: BC / Plan: BCBS OUT OF STATE / Product Type: *No Product type* /                     Patient  verified yes     Visit #   Current  / Total 7 14   Time   In / Out 917 A 958 A   Total Treatment Time 41   Total Timed Codes 41         SUBJECTIVE    Pain Level (0-10 scale): 0/10 a rest    Any medication changes, allergies to medications, adverse drug reactions, diagnosis change, or new procedure performed?: [x] No    [] Yes (see summary sheet for update)  Medications: Verified on Patient Summary List    Subjective functional status/changes:     Pt was able to run for 45 minutes with no pain. She isn't having pain in general.   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 She is waiting to make sure that she is doing well before this is increased. She would ramp up to 20-30 miles.   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   She is walking 08012 -01288 steps doing this on her TM while working. She does do some sitting.   She is not cycling or swimming right now. She will get back to swimming 1 day per week.      OBJECTIVE      Therapeutic Procedures:  Tx Min Billable or 1:1 Min (if diff from Tx Min) Procedure, Rationale, Specifics   41  83476 Neuromuscular Re-Education (timed):  improve balance, coordination, kinesthetic sense, posture, core stability and proprioception to improve patient's ability to develop conscious control of individual muscles and awareness of position of extremities in order to progress to PLOF and address remaining functional goals. (see flow sheet as applicable)

## 2024-08-13 ENCOUNTER — APPOINTMENT (OUTPATIENT)
Facility: HOSPITAL | Age: 34
End: 2024-08-13
Payer: COMMERCIAL

## 2024-09-04 ENCOUNTER — HOSPITAL ENCOUNTER (OUTPATIENT)
Facility: HOSPITAL | Age: 34
Setting detail: RECURRING SERIES
Discharge: HOME | End: 2024-09-07
Payer: COMMERCIAL

## 2024-09-04 PROCEDURE — 97112 NEUROMUSCULAR REEDUCATION: CPT | Performed by: PHYSICAL THERAPIST

## 2024-09-04 NOTE — PROGRESS NOTES
36     Total Total                  [x]  Patient Education billed concurrently with other procedures   [x] Review HEP    [] Progressed/Changed HEP, detail:    [] Other detail:         Other Objective/Functional Measures  HAdDT: + L  PADT:  + L   HGIR: - B  Horizontal Abd: WNL and full bilaterally  Limited R shoulder flex  HAdLT: 4/5 pre-tretment bilaterally  Functional Squat: 5/5    + PART R     All + were - after intervention       Pain Level at end of session (0-10 scale): 0/10      Assessment     Patient will continue to benefit from skilled PT / OT services to modify and progress therapeutic interventions, analyze and address functional mobility deficits, analyze and address ROM deficits, analyze and address strength deficits, analyze and address soft tissue restrictions, analyze and cue for proper movement patterns, analyze and modify for postural abnormalities, analyze and address imbalance/dizziness, and instruct in home and community integration to address functional deficits and attain remaining goals.    Progress toward goals / Updated goals:  []  See Progress Note/Recertification      PLAN  Yes  Continue plan of care  Re-Cert Due: 14 treatments  []  Upgrade activities as tolerated  []  Discharge due to:  [x]  Other: return in 3-4 wk for advancement URBAN Abbasi, PT, DPT, PRC         9/4/2024         9:33 AM

## 2024-09-27 ENCOUNTER — HOSPITAL ENCOUNTER (OUTPATIENT)
Facility: HOSPITAL | Age: 34
Setting detail: RECURRING SERIES
Discharge: HOME | End: 2024-09-30
Payer: COMMERCIAL

## 2024-09-27 PROCEDURE — 97112 NEUROMUSCULAR REEDUCATION: CPT | Performed by: PHYSICAL THERAPIST

## 2024-09-27 NOTE — PROGRESS NOTES
PHYSICAL THERAPY - DAILY TREATMENT NOTE (updated 3/23)      Date: 2024          Patient Name:  Coretta Casiano :  1990   Medical   Diagnosis:  Right knee pain [M25.561] Treatment Diagnosis:  M25.561  RIGHT KNEE PAIN    Referral Source:  Binu Sutton MD Insurance:   Payor: BC / Plan: BCBS OUT OF STATE / Product Type: *No Product type* /                     Patient  verified yes     Visit #   Current  / Total 9 14   Time   In / Out 748 A 826 A   Total Treatment Time 38   Total Timed Codes 38         SUBJECTIVE    Pain Level (0-10 scale): 0/10     Any medication changes, allergies to medications, adverse drug reactions, diagnosis change, or new procedure performed?: [x] No    [] Yes (see summary sheet for update)  Medications: Verified on Patient Summary List    Subjective functional status/changes:     No pain and no symptoms present.  Running 4-5 days per week. Not cycling or swimming.  Before running will do pec release and standing step around are done daily and and also before running.  Other JOIE exercises are done 3-4 times per week.      OBJECTIVE      Therapeutic Procedures:  Tx Min Billable or 1:1 Min (if diff from Tx Min) Procedure, Rationale, Specifics   38  06140 Neuromuscular Re-Education (timed):  improve balance, coordination, kinesthetic sense, posture, core stability and proprioception to improve patient's ability to develop conscious control of individual muscles and awareness of position of extremities in order to progress to PLOF and address remaining functional goals. (see flow sheet as applicable)     Details if applicable:          38     Total Total                  [x]  Patient Education billed concurrently with other procedures   [x] Review HEP    [] Progressed/Changed HEP, detail:    [] Other detail:         Other Objective/Functional Measures    HAdDT: -  bilaterally  PADT:  - bilaterally   HGIR: - B  Horizontal Abd: WNL and full bilaterally  R shoulder flex =

## 2024-10-30 ENCOUNTER — APPOINTMENT (OUTPATIENT)
Facility: HOSPITAL | Age: 34
End: 2024-10-30
Payer: COMMERCIAL

## 2024-10-31 ENCOUNTER — HOSPITAL ENCOUNTER (OUTPATIENT)
Facility: HOSPITAL | Age: 34
Setting detail: RECURRING SERIES
Discharge: HOME | End: 2024-11-03
Payer: COMMERCIAL

## 2024-10-31 PROCEDURE — 97112 NEUROMUSCULAR REEDUCATION: CPT | Performed by: PHYSICAL THERAPIST

## 2024-10-31 PROCEDURE — 97116 GAIT TRAINING THERAPY: CPT | Performed by: PHYSICAL THERAPIST

## 2024-10-31 NOTE — PROGRESS NOTES
PHYSICAL THERAPY - DAILY TREATMENT NOTE (updated 3/23)      Date: 10/31/2024          Patient Name:  Coretta Casiano :  1990   Medical   Diagnosis:  Right knee pain [M25.561] Treatment Diagnosis:  M25.561  RIGHT KNEE PAIN    Referral Source:  Binu Sutton MD Insurance:   Payor: BCBS / Plan: BCBS OUT OF STATE / Product Type: *No Product type* /                     Patient  verified yes     Visit #   Current  / Total 10 14   Time   In / Out 130 P 200 P   Total Treatment Time 30   Total Timed Codes 30         SUBJECTIVE    Pain Level (0-10 scale): 4/10     Any medication changes, allergies to medications, adverse drug reactions, diagnosis change, or new procedure performed?: [x] No    [] Yes (see summary sheet for update)  Medications: Verified on Patient Summary List    Subjective functional status/changes:     Pt reports that about a week ago she was doing a 6 mile run and began to get pain at around 4 miles. She rested, did her JOIE PT and took some days off. This didn't really improve things much so she is here to see what is going on.  Pt brought 3 pairs of her shoes with her.      OBJECTIVE      Therapeutic Procedures:  Tx Min Billable or 1:1 Min (if diff from Tx Min) Procedure, Rationale, Specifics   15  90292 Neuromuscular Re-Education (timed):  improve balance, coordination, kinesthetic sense, posture, core stability and proprioception to improve patient's ability to develop conscious control of individual muscles and awareness of position of extremities in order to progress to PLOF and address remaining functional goals. (see flow sheet as applicable)     Details if applicable:     15  28869 Gait Training (timed):    500 feet with no (assistive device) over flat surfaces with supervision level of assist. Cuing for heel strike and great toe contact.  To improve safety and dynamic movement with household/community ambulation.  (see flow sheet as applicable)    Details if applicable:

## 2024-11-01 ENCOUNTER — APPOINTMENT (OUTPATIENT)
Facility: HOSPITAL | Age: 34
End: 2024-11-01
Payer: COMMERCIAL

## 2024-12-06 ENCOUNTER — HOSPITAL ENCOUNTER (OUTPATIENT)
Facility: HOSPITAL | Age: 34
Setting detail: RECURRING SERIES
Discharge: HOME | End: 2024-12-09
Payer: COMMERCIAL

## 2024-12-06 PROCEDURE — 97535 SELF CARE MNGMENT TRAINING: CPT | Performed by: PHYSICAL THERAPIST

## 2024-12-06 PROCEDURE — 97112 NEUROMUSCULAR REEDUCATION: CPT | Performed by: PHYSICAL THERAPIST

## 2024-12-06 NOTE — PROGRESS NOTES
PHYSICAL THERAPY - DAILY TREATMENT NOTE (updated 3/23)      Date: 2024          Patient Name:  Coretta Casiano :  1990   Medical   Diagnosis:  Right knee pain [M25.561] Treatment Diagnosis:  M25.561  RIGHT KNEE PAIN    Referral Source:  Binu Sutton MD Insurance:   Payor: [place] [employer] / [place]: [place] [employer] / Product Type: *No Product type* /                     Patient  verified yes     Visit #   Current  / Total 11 14   Time   In / Out 1020 A 1100 A   Total Treatment Time 40   Total Timed Codes 40         SUBJECTIVE    Pain Level (0-10 scale): 2-310     Any medication changes, allergies to medications, adverse drug reactions, diagnosis change, or new procedure performed?: [x] No    [] Yes (see summary sheet for update)  Medications: Verified on Patient Summary List    Subjective functional status/changes:     Pt reports that she has been up and down since her last visit on [facility]. She has found some shoes that she thinks work and has been taking a bit of time off of running. Her stomach has definitely gotten larger in the last few weeks and she is feeling some pelvic pain because of this. The baby has been checked out and her doctor says that it is pelvic pain. She did see the pelvic floor PT via telehealth with her company and has some exercises to do, however she hasn't tried them yet.   She is having some low-grade back pain today.      OBJECTIVE      Therapeutic Procedures:  Tx Min Billable or 1:1 Min (if diff from Tx Min) Procedure, Rationale, Specifics   30  42388 Neuromuscular Re-Education (timed):  improve balance, coordination, kinesthetic sense, posture, core stability and proprioception to improve patient's ability to develop conscious control of individual muscles and awareness of position of extremities in order to progress to PLOF and address remaining functional goals. (see flow sheet as applicable)     Details if applicable:     10  21765 Self Care/Home Management (timed):  improve

## 2024-12-23 ENCOUNTER — HOSPITAL ENCOUNTER (OUTPATIENT)
Facility: HOSPITAL | Age: 34
Setting detail: RECURRING SERIES
Discharge: HOME | End: 2024-12-26
Payer: COMMERCIAL

## 2024-12-23 PROCEDURE — 97112 NEUROMUSCULAR REEDUCATION: CPT

## 2024-12-23 PROCEDURE — 97140 MANUAL THERAPY 1/> REGIONS: CPT

## 2024-12-23 NOTE — PROGRESS NOTES
PHYSICAL THERAPY - DAILY TREATMENT NOTE (updated 3/23)      Date: 2024          Patient Name:  Coretta Casiano :  1990   Medical   Diagnosis:  Right knee pain [M25.561] Treatment Diagnosis:  M25.561  RIGHT KNEE PAIN    Referral Source:  Binu Sutton MD Insurance:   [place] [employer] / [place]: [place] [employer] / Product Type: *No Product type* /                     Patient  verified yes     Visit #   Current  / Total 12 14   Time   In / Out 1100 A 1145 A   Total Treatment Time    Total Timed Codes 45         SUBJECTIVE    Pain Level (0-10 scale): 4/10     Any medication changes, allergies to medications, adverse drug reactions, diagnosis change, or new procedure performed?: [x] No    [] Yes (see summary sheet for update)  Medications: Verified on Patient Summary List    Subjective functional status/changes:     Patient is 21 weeks pregnant with 2nd child.  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  Denies vaginal heaviness/pain.  Reports that she had a 3 finger DRAM, and had rectocele, able to heal using virtual pelvic PT over the course of 2 years.        OBJECTIVE      Therapeutic Procedures:  Tx Min Billable or 1:1 Min (if diff from Tx Min) Procedure, Rationale, Specifics   30  23042 Neuromuscular Re-Education (timed):  improve balance, coordination, kinesthetic sense, posture, core stability and proprioception to improve patient's ability to develop conscious control of individual muscles and awareness of position of extremities in order to progress to PLOF and address remaining functional goals. (see flow sheet as applicable)     Details if applicable:     11 66290 Manual Therapy (timed):  decrease pain, increase ROM, increase tissue extensibility, and increase postural awareness to improve patient's ability to progress to PLOF and address remaining functional goals.  The manual therapy interventions were performed at a

## 2024-12-24 ENCOUNTER — HOSPITAL ENCOUNTER (OUTPATIENT)
Facility: HOSPITAL | Age: 34
Setting detail: RECURRING SERIES
End: 2024-12-24
Payer: COMMERCIAL

## 2025-02-12 ENCOUNTER — HOSPITAL ENCOUNTER (OUTPATIENT)
Facility: HOSPITAL | Age: 35
Setting detail: RECURRING SERIES
End: 2025-02-12
Payer: COMMERCIAL

## 2025-03-04 ENCOUNTER — APPOINTMENT (OUTPATIENT)
Facility: HOSPITAL | Age: 35
End: 2025-03-04
Payer: COMMERCIAL

## 2025-03-05 ENCOUNTER — HOSPITAL ENCOUNTER (EMERGENCY)
Facility: HOSPITAL | Age: 35
Discharge: HOME | End: 2025-03-05
Payer: COMMERCIAL

## 2025-03-05 VITALS
DIASTOLIC BLOOD PRESSURE: 74 MMHG | HEART RATE: 72 BPM | TEMPERATURE: 98 F | RESPIRATION RATE: 16 BRPM | SYSTOLIC BLOOD PRESSURE: 122 MMHG

## 2025-03-05 LAB
APPEARANCE UR: CLEAR
BACTERIA URNS QL MICRO: ABNORMAL /HPF
BILIRUB UR QL: NEGATIVE
COLOR UR: ABNORMAL
EPITH CASTS URNS QL MICRO: ABNORMAL /LPF
GLUCOSE UR STRIP.AUTO-MCNC: NEGATIVE MG/DL
HGB UR QL STRIP: NEGATIVE
KETONES UR QL STRIP.AUTO: NEGATIVE MG/DL
LEUKOCYTE ESTERASE UR QL STRIP.AUTO: ABNORMAL
NITRITE UR QL STRIP.AUTO: NEGATIVE
PH UR STRIP: 7 (ref 5–8)
PROT UR STRIP-MCNC: NEGATIVE MG/DL
RBC #/AREA URNS HPF: ABNORMAL /HPF (ref 0–5)
SP GR UR REFRACTOMETRY: 1.01 (ref 1–1.03)
URINE CULTURE IF INDICATED: ABNORMAL
UROBILINOGEN UR QL STRIP.AUTO: 0.2 EU/DL (ref 0.2–1)
WBC URNS QL MICRO: ABNORMAL /HPF (ref 0–4)

## 2025-03-05 PROCEDURE — 81001 URINALYSIS AUTO W/SCOPE: CPT

## 2025-03-05 PROCEDURE — 99283 EMERGENCY DEPT VISIT LOW MDM: CPT

## 2025-03-05 NOTE — ED TRIAGE NOTES
1747- Patient arrived to [facility] with complaints of abd pain/ back pain since Monday. Patient states positive fetal movement and denies any leaking or bleeding    1817- Dr Mathew at bedside    1830- POC to discharge patient home. Will notify patient if antibiotics are needed for urine, pharmacy updated in computer    1833- Patient discharged home with discharge instructions. Patient states understand of POC and states no other questions or complaints at this time

## 2025-03-05 NOTE — DISCHARGE INSTRUCTIONS
Weeks 30 to 32 of Your Pregnancy: Care Instructions  Your baby is growing more every day. Its eyes can open and close, and it may have hair on its head. Your baby may sleep 20 to 45 minutes at a time and is more active at certain times.    You should feel your baby move several times every day. Your baby now turns less and kicks more.   This is a good time to tour your hospital or birthing center. You may also want to find childcare if needed.         To ease heartburn   Avoid foods that make your symptoms worse, such as chocolate, spicy foods, and caffeine.  Avoid bending over or lying down after meals.  Do not eat for 2 hours before bedtime.  Take antacids like Tums, but don't take ones that have sodium bicarbonate, magnesium trisilicate, or aspirin.        To care for large, swollen veins (varicose veins)   Try to avoid standing for long periods of time.  Sit with your feet propped up.  Wear support hose.  Get some exercise every day, like walking or swimming.  Counting your baby's kicks  Your doctor may ask you to count your baby's movements, such as kicks, flutters, or rolls.    Find a quiet place, and get comfortable. Write down your start time. Count your baby's movements (except hiccups). When your baby has moved 10 times, you can stop counting. Write down how many minutes it took.   If an hour goes by and you don't feel 10 movements, have something to eat or drink. Count for another hour. If you don't feel at least 10 movements in the 2-hour period, call your doctor.   Follow-up care is a key part of your treatment and safety. Be sure to make and go to all appointments, and call your doctor if you are having problems. It's also a good idea to know your test results and keep a list of the medicines you take.  Where can you learn more?  Go to https://[email or web address].net/patientEd and enter X471 to learn more about \"Weeks 30 to 32 of Your Pregnancy: Care Instructions.\"  Current as of: April 30,

## 2025-03-06 ENCOUNTER — HOSPITAL ENCOUNTER (OUTPATIENT)
Facility: HOSPITAL | Age: 35
Setting detail: RECURRING SERIES
Discharge: HOME | End: 2025-03-09
Payer: COMMERCIAL

## 2025-03-06 PROCEDURE — 97112 NEUROMUSCULAR REEDUCATION: CPT

## 2025-03-06 PROCEDURE — 97140 MANUAL THERAPY 1/> REGIONS: CPT

## 2025-03-06 NOTE — PROGRESS NOTES
PHYSICAL THERAPY - DAILY TREATMENT NOTE (updated 3/23)      Date: 3/6/2025          Patient Name:  Coretta Casiano :  1990   Medical   Diagnosis:  Right knee pain [M25.561] Treatment Diagnosis:  M25.561  RIGHT KNEE PAIN    Referral Source:  Binu Sutton MD Insurance:   Payor: [place] [employer] / [place]: [place] [employer] / Product Type: *No Product type* /                     Patient  verified yes     Visit #   Current  / Total 13 14   Time   In / Out 120p 200P   Total Treatment Time 40   Total Timed Codes 40         SUBJECTIVE    Pain Level (0-10 scale): 4/10     Any medication changes, allergies to medications, adverse drug reactions, diagnosis change, or new procedure performed?: [x] No    [] Yes (see summary sheet for update)  Medications: Verified on Patient Summary List    Subjective functional status/changes:     Things have been gettting steadily more painful over the last few weeks.  She was previously managing SIJ pain well with the belt.  She also started having pubic symphysis pain after running after her child last week.  This is coupled with lower abdominal pain. She went to her OB/ER and everything checked out but she has a trace infection.  She will f/u with OB about whether to medicate.      OBJECTIVE      Therapeutic Procedures:  Tx Min Billable or 1:1 Min (if diff from Tx Min) Procedure, Rationale, Specifics   30  19626 Neuromuscular Re-Education (timed):  improve balance, coordination, kinesthetic sense, posture, core stability and proprioception to improve patient's ability to develop conscious control of individual muscles and awareness of position of extremities in order to progress to PLOF and address remaining functional goals. (see flow sheet as applicable)     Details if applicable:     10  04481 Manual Therapy (timed):  decrease pain, increase ROM, increase tissue extensibility, and increase postural awareness to improve patient's ability to progress to PLOF and address remaining functional

## 2025-03-20 ENCOUNTER — HOSPITAL ENCOUNTER (OUTPATIENT)
Facility: HOSPITAL | Age: 35
Setting detail: RECURRING SERIES
Discharge: HOME | End: 2025-03-23
Payer: COMMERCIAL

## 2025-03-20 PROCEDURE — 97140 MANUAL THERAPY 1/> REGIONS: CPT

## 2025-03-20 PROCEDURE — 97112 NEUROMUSCULAR REEDUCATION: CPT

## 2025-03-20 NOTE — PROGRESS NOTES
PHYSICAL THERAPY - DAILY TREATMENT NOTE (updated 3/23)      Date: 3/20/2025          Patient Name:  Coretta Casiano :  1990   Medical   Diagnosis:  Right knee pain [M25.561] Treatment Diagnosis:  M25.561  RIGHT KNEE PAIN    Referral Source:  Binu Sutton MD Insurance:   Payor: [place] [employer] / Plan: [place] [employer] / Product Type: *No Product type* /                     Patient  verified yes     Visit #   Current  / Total 14 14   Time   In / Out 300p 345P   Total Treatment Time 45   Total Timed Codes 45         SUBJECTIVE    Pain Level (0-10 scale): 10     Any medication changes, allergies to medications, adverse drug reactions, diagnosis change, or new procedure performed?: [x] No    [] Yes (see summary sheet for update)  Medications: Verified on Patient Summary List    Subjective functional status/changes:       Doing much better this week.  The exercises and belt are helping.  Experiencing some anterior/inner thigh pain of the left hip with walking and transitional movements.      OBJECTIVE      Therapeutic Procedures:  Tx Min Billable or 1:1 Min (if diff from Tx Min) Procedure, Rationale, Specifics   30  58813 Neuromuscular Re-Education (timed):  improve balance, coordination, kinesthetic sense, posture, core stability and proprioception to improve patient's ability to develop conscious control of individual muscles and awareness of position of extremities in order to progress to PLOF and address remaining functional goals. (see flow sheet as applicable)     Details if applicable:     15  24950 Manual Therapy (timed):  decrease pain, increase ROM, increase tissue extensibility, and increase postural awareness to improve patient's ability to progress to PLOF and address remaining functional goals.  The manual therapy interventions were performed at a separate and distinct time from the therapeutic activities interventions . (see flow sheet as applicable)    Details if applicable:   k-taping application,

## 2025-04-01 ENCOUNTER — HOSPITAL ENCOUNTER (OUTPATIENT)
Facility: HOSPITAL | Age: 35
Setting detail: RECURRING SERIES
Discharge: HOME | End: 2025-04-04
Payer: COMMERCIAL

## 2025-04-01 PROCEDURE — 97140 MANUAL THERAPY 1/> REGIONS: CPT

## 2025-04-01 PROCEDURE — 97112 NEUROMUSCULAR REEDUCATION: CPT

## 2025-04-01 NOTE — PROGRESS NOTES
PHYSICAL THERAPY - DAILY TREATMENT NOTE (updated 3/23)      Date: 2025          Patient Name:  Coretta Casiano :  1990   Medical   Diagnosis:  Right knee pain [M25.561] Treatment Diagnosis:  M25.561  RIGHT KNEE PAIN    Referral Source:  Binu Sutton MD Insurance:   Payor: [place] [employer] / Plan: [place] [employer] / Product Type: *No Product type* /                     Patient  verified yes     Visit #   Current  / Total 15 26   Time   In / Out 130p 215P   Total Treatment Time 45   Total Timed Codes 45         SUBJECTIVE    Pain Level (0-10 scale): 4/10     Any medication changes, allergies to medications, adverse drug reactions, diagnosis change, or new procedure performed?: [x] No    [] Yes (see summary sheet for update)  Medications: Verified on Patient Summary List    Subjective functional status/changes:       Doing better this week.  If she overdoes it she will have pubic bone/back pain.  Trying to get 10,000 steps in a day, but if she gets it in too early in the day she will feel worse.  Feels like her diastasis is getting worse.      OBJECTIVE      Therapeutic Procedures:  Tx Min Billable or 1:1 Min (if diff from Tx Min) Procedure, Rationale, Specifics   30  04473 Neuromuscular Re-Education (timed):  improve balance, coordination, kinesthetic sense, posture, core stability and proprioception to improve patient's ability to develop conscious control of individual muscles and awareness of position of extremities in order to progress to PLOF and address remaining functional goals. (see flow sheet as applicable)     Details if applicable:     02 82343 Manual Therapy (timed):  decrease pain, increase ROM, increase tissue extensibility, and increase postural awareness to improve patient's ability to progress to PLOF and address remaining functional goals.  The manual therapy interventions were performed at a separate and distinct time from the therapeutic activities interventions . (see flow sheet as

## 2025-04-03 ENCOUNTER — APPOINTMENT (OUTPATIENT)
Facility: HOSPITAL | Age: 35
End: 2025-04-03
Payer: COMMERCIAL

## 2025-04-21 ENCOUNTER — HOSPITAL ENCOUNTER (OUTPATIENT)
Facility: HOSPITAL | Age: 35
Setting detail: RECURRING SERIES
Discharge: HOME | End: 2025-04-24
Payer: COMMERCIAL

## 2025-04-21 PROCEDURE — 97140 MANUAL THERAPY 1/> REGIONS: CPT

## 2025-04-21 PROCEDURE — 97112 NEUROMUSCULAR REEDUCATION: CPT

## 2025-04-21 NOTE — PROGRESS NOTES
PHYSICAL THERAPY - DAILY TREATMENT NOTE (updated 3/23)      Date: 2025          Patient Name:  Coretta Casiano :  1990   Medical   Diagnosis:  Right knee pain [M25.561] Treatment Diagnosis:  M25.561  RIGHT KNEE PAIN    Referral Source:  Binu Sutton MD Insurance:   [place]or: [place] [employer] / [place]: [place] [employer] / Product Type: *No Product type* /                     Patient  verified yes     Visit #   Current  / Total 16 26   Time   In / Out 930a 1030a   Total Treatment Time 45   Total Timed Codes 45         SUBJECTIVE    Pain Level (0-10 scale): 4/10     Any medication changes, allergies to medications, adverse drug reactions, diagnosis change, or new procedure performed?: [x] No    [] Yes (see summary sheet for update)  Medications: Verified on Patient Summary List    Subjective functional status/changes:       Very uncomfortable right now.  38 weeks today. US confirms that baby is head down. Not showing any signs of labor.  Swelling more and experiencing GI upset, and breast tenderness.       OBJECTIVE      Therapeutic Procedures:  Tx Min Billable or 1:1 Min (if diff from Tx Min) Procedure, Rationale, Specifics   15  24410 Neuromuscular Re-Education (timed):  improve balance, coordination, kinesthetic sense, posture, core stability and proprioception to improve patient's ability to develop conscious control of individual muscles and awareness of position of extremities in order to progress to PLOF and address remaining functional goals. (see flow sheet as applicable)     Details if applicable:     30  37467 Manual Therapy (timed):  decrease pain, increase ROM, increase tissue extensibility, and increase postural awareness to improve patient's ability to progress to PLOF and address remaining functional goals.  The manual therapy interventions were performed at a separate and distinct time from the therapeutic activities interventions . (see flow sheet as applicable)    Details if applicable:   SI

## 2025-04-23 NOTE — H&P
History and Physical    Cc: abdominal/pelvic discomfort    HPI:  Coretta Casiano is an 34 y.o.  at approximately 31 weeks gestation.  She notes feeling crampy and uncomfortable and notes exercising regularly and notes more cramping after exercise.  She notes active FM.  She denies leakage of fluid or vaginal bleeding.    History reviewed. No pertinent past medical history.    Objective:    Allergies:   Allergies   Allergen Reactions    Penicillins        Active Problems:    * No active hospital problems. *  Resolved Problems:    * No resolved hospital problems. *    Blood pressure 122/74, pulse 72, temperature 98 °F (36.7 °C), temperature source Oral, resp. rate 16.    Review of Systems  Negative other than that noted in HPI    Physical Exam  Gen: NAD  Resp: CTAB  CV: RRR  Abd: soft, NT, ND, + BS, gravid  Ext: no edema    Cervix: closed/2L/thick/high    NST:  Baseline FHTs: 145, moderate variability, + accels, no decels - REACTIVE    [place]: no contractions seen, occasional irritability noted    UA: WNL    Assessment:   with discomforts of pregnancy, possible laine-newsome contractions, no si/sx of PTL    Plan:  - cervix closed, no contractions on monitor and UA wnl  - reassurance given, discussed that BH contractions may worsen with more extreme activity and dehydration;   - questions answered  - NST is reactive  - pt has f/u in the office next week  - precautions reviewed with the patient    Colleen Mathew MD

## 2025-04-29 ENCOUNTER — HOSPITAL ENCOUNTER (OUTPATIENT)
Facility: HOSPITAL | Age: 35
Setting detail: RECURRING SERIES
End: 2025-04-29
Payer: COMMERCIAL

## 2025-05-12 ENCOUNTER — APPOINTMENT (OUTPATIENT)
Facility: HOSPITAL | Age: 35
End: 2025-05-12
Payer: COMMERCIAL

## 2025-05-27 ENCOUNTER — HOSPITAL ENCOUNTER (OUTPATIENT)
Facility: HOSPITAL | Age: 35
Setting detail: RECURRING SERIES
Discharge: HOME | End: 2025-05-30
Payer: COMMERCIAL

## 2025-05-27 PROCEDURE — 97535 SELF CARE MNGMENT TRAINING: CPT

## 2025-05-27 PROCEDURE — 97140 MANUAL THERAPY 1/> REGIONS: CPT

## 2025-05-27 PROCEDURE — 97112 NEUROMUSCULAR REEDUCATION: CPT

## 2025-05-27 NOTE — PROGRESS NOTES
Physical Therapy at [facility],   a part of [address], [address]  [place] [address]  Phone: [phone]  Fax: [phone]  CONTINUED PLAN OF CARE/RECERTIFICATION FOR PHYSICAL THERAPY          Patient Name:              Coretta Casiano :  1990   Treatment/Medical Diagnosis:  Right knee pain [M25.561]  Right hip pain [M25.551]   Onset Date:  24    Referral Source:  Binu Sutton MD Start of Care (SOC):  24   Prior Hospitalization:  See Medical History Provider #:  [id number]      Prior Level of Function (PLOF):  Previous runner (40mile/week)   Comorbidities:  N/a   Medications:  Verified on Patient Summary List   Visits from SOC:  17 Missed Visits:  0     Progress toward Goals:  Short Term Goals: To be accomplished in 5 treatments.  Patient will demonstrate Negative Hruska Adduction Drop Test (Adrian Test) to allow for sit to stand transfer without compensation or assistance needed -Progressing  Patient will demonstrate independence with HEP without v.c. to allow for car transfers and showering without compensation or pain-MET  Patient will demonstrate greater than Grade II on Hruska Adduction Lift Test to demonstrate ambulation of a minimum of 500 ft with min compensatory deviations and v.c. needed for form correction -Progressing    Key Functional Changes/Progress: Patient recently gave birth to 2nd child 25 via  without complication  Problem List: pain affecting function, decrease ROM, decrease strength, impaired gait/balance, decrease ADL/functional abilities, decrease activity tolerance, and decrease flexibility/joint mobility   Treatment Plan may include any combination of the followin Therapeutic Exercise, 08851 Neuromuscular Re-Education, 46474 Manual Therapy, 10433 Therapeutic Activity, 73106 Self Care/Home Management, 05404 Electrical Stim unattended /  (MCR), 71922 Gait Training, 66282

## 2025-05-27 NOTE — PROGRESS NOTES
PHYSICAL THERAPY - DAILY TREATMENT NOTE (updated 3/23)      Date: 2025          Patient Name:  Coretta Casiano :  1990   Medical   Diagnosis:  Right knee pain [M25.561]  Right hip pain [M25.551] Treatment Diagnosis:  M25.561  RIGHT KNEE PAIN    Referral Source:  Binu Sutton MD Insurance:   [place]: [place] [employer] / [place] / Product Type: *No Product type* /                     Patient  verified yes     Visit #   Current  / Total 17 26   Time   In / Out 1015a 1110a   Total Treatment Time 55   Total Timed Codes 55         SUBJECTIVE    Pain Level (0-10 scale): 10     Any medication changes, allergies to medications, adverse drug reactions, diagnosis change, or new procedure performed?: [x] No    [] Yes (see summary sheet for update)  Medications: Verified on Patient Summary List    Subjective functional status/changes:       Delivered second child, Richard Bentley on 25 via  with spontaneous labor.   Saw her MD last week, confirmed a grade 1-2 rectocele.  She has measured her DRAM, and thinks it might be 2 fingers.  Wearing an abdominal brace.  Able to walk 45min- 1 hour with double stroller.   Some bilateral ITB pain. Managing bowels with miralax.   No urine incontinence episode.  Nursing and pumping exclussively.  Returns to work in August.      OBJECTIVE      Therapeutic Procedures:  Tx Min Billable or 1:1 Min (if diff from Tx Min) Procedure, Rationale, Specifics   30  67769 Neuromuscular Re-Education (timed):  improve balance, coordination, kinesthetic sense, posture, core stability and proprioception to improve patient's ability to develop conscious control of individual muscles and awareness of position of extremities in order to progress to PLOF and address remaining functional goals. (see flow sheet as applicable)     Details if applicable:     37 16129 Manual Therapy (timed):  decrease pain, increase ROM, increase tissue extensibility, and increase postural awareness to

## 2025-06-03 ENCOUNTER — HOSPITAL ENCOUNTER (OUTPATIENT)
Facility: HOSPITAL | Age: 35
Setting detail: RECURRING SERIES
Discharge: HOME | End: 2025-06-06
Payer: COMMERCIAL

## 2025-06-03 PROCEDURE — 97112 NEUROMUSCULAR REEDUCATION: CPT

## 2025-06-03 NOTE — PROGRESS NOTES
PHYSICAL THERAPY - DAILY TREATMENT NOTE (updated 3/23)      Date: 6/3/2025          Patient Name:  Coretta Casiano :  1990   Medical   Diagnosis:  Right knee pain [M25.561]  Right hip pain [M25.551] Treatment Diagnosis:  M25.561  RIGHT KNEE PAIN    Referral Source:  Binu Sutton MD Insurance:   [place] [employer] / [place] / Product Type: *No Product type* /                     Patient  verified yes     Visit #   Current  / Total 18 33   Time   In / Out 1015a 1100a   Total Treatment Time 45   Total Timed Codes 45         SUBJECTIVE    Pain Level (0-10 scale): 4/10     Any medication changes, allergies to medications, adverse drug reactions, diagnosis change, or new procedure performed?: [x] No    [] Yes (see summary sheet for update)  Medications: Verified on Patient Summary List    Subjective functional status/changes:       Its hard to fit the exercises in last week with both kids home.  Toddler is starting camp this week.  She was able to do the breath work in seated and standing positions.      OBJECTIVE      Therapeutic Procedures:  Tx Min Billable or 1:1 Min (if diff from Tx Min) Procedure, Rationale, Specifics   45  76246 Neuromuscular Re-Education (timed):  improve balance, coordination, kinesthetic sense, posture, core stability and proprioception to improve patient's ability to develop conscious control of individual muscles and awareness of position of extremities in order to progress to PLOF and address remaining functional goals. (see flow sheet as applicable)     Details if applicable:     nt  92811 Manual Therapy (timed):  decrease pain, increase ROM, increase tissue extensibility, and increase postural awareness to improve patient's ability to progress to PLOF and address remaining functional goals.  The manual therapy interventions were performed at a separate and distinct time from the therapeutic activities interventions . (see flow sheet as applicable)    Details if

## 2025-06-10 ENCOUNTER — APPOINTMENT (OUTPATIENT)
Facility: HOSPITAL | Age: 35
End: 2025-06-10
Payer: COMMERCIAL

## 2025-07-07 ENCOUNTER — HOSPITAL ENCOUNTER (OUTPATIENT)
Facility: HOSPITAL | Age: 35
Setting detail: RECURRING SERIES
Discharge: HOME | End: 2025-07-10
Payer: COMMERCIAL

## 2025-07-07 PROCEDURE — 97112 NEUROMUSCULAR REEDUCATION: CPT

## 2025-07-07 NOTE — PROGRESS NOTES
PHYSICAL THERAPY - DAILY TREATMENT NOTE (updated 3/23)      Date: 2025          Patient Name:  Coretta Casiano :  1990   Medical   Diagnosis:  Right knee pain [M25.561]  Right hip pain [M25.551] Treatment Diagnosis:  M25.561  RIGHT KNEE PAIN    Referral Source:  Binu Sutton MD Insurance:   [place] [employer] / [place] / Product Type: *No Product type* /                     Patient  verified yes     Visit #   Current  / Total 19 33   Time   In / Out 300p 345p   Total Treatment Time 45   Total Timed Codes 45         SUBJECTIVE    Pain Level (0-10 scale): 4/10     Any medication changes, allergies to medications, adverse drug reactions, diagnosis change, or new procedure performed?: [x] No    [] Yes (see summary sheet for update)  Medications: Verified on Patient Summary List    Subjective functional status/changes:       Traveling a lot of the past month.  She is running again without UI.  Still feels presence of her POP when her rectum is full.  Going every other day.  Lifted weights this morning and experienced mild back pain.      OBJECTIVE      Therapeutic Procedures:  Tx Min Billable or 1:1 Min (if diff from Tx Min) Procedure, Rationale, Specifics   45  87788 Neuromuscular Re-Education (timed):  improve balance, coordination, kinesthetic sense, posture, core stability and proprioception to improve patient's ability to develop conscious control of individual muscles and awareness of position of extremities in order to progress to PLOF and address remaining functional goals. (see flow sheet as applicable)     Details if applicable:     nt  42670 Manual Therapy (timed):  decrease pain, increase ROM, increase tissue extensibility, and increase postural awareness to improve patient's ability to progress to PLOF and address remaining functional goals.  The manual therapy interventions were performed at a separate and distinct time from the therapeutic activities interventions . (see flow

## 2025-07-22 ENCOUNTER — HOSPITAL ENCOUNTER (OUTPATIENT)
Facility: HOSPITAL | Age: 35
Setting detail: RECURRING SERIES
Discharge: HOME | End: 2025-07-25
Payer: COMMERCIAL

## 2025-07-22 PROCEDURE — 97140 MANUAL THERAPY 1/> REGIONS: CPT

## 2025-07-22 PROCEDURE — 97112 NEUROMUSCULAR REEDUCATION: CPT

## 2025-07-22 NOTE — PROGRESS NOTES
PHYSICAL THERAPY - DAILY TREATMENT NOTE (updated 3/23)      Date: 2025          Patient Name:  Coretta Casiano :  1990   Medical   Diagnosis:  Right knee pain [M25.561]  Right hip pain [M25.551] Treatment Diagnosis:  M25.561  RIGHT KNEE PAIN    Referral Source:  Binu Sutton MD Insurance:   [place] [employer] / [place] / Product Type: *No Product type* /                     Patient  verified yes     Visit #   Current  / Total 21 33   Time   In / Out 930a 1015a   Total Treatment Time 45   Total Timed Codes 45         SUBJECTIVE    Pain Level (0-10 scale): 4/10     Any medication changes, allergies to medications, adverse drug reactions, diagnosis change, or new procedure performed?: [x] No    [] Yes (see summary sheet for update)  Medications: Verified on Patient Summary List    Subjective functional status/changes:       Able to have BM this week without splinting.  Feels a sense of POP after BM, but thinks it might be bladder as well.  Having some medial knee pain after running a short distance with toddler the other day.      OBJECTIVE      Therapeutic Procedures:  Tx Min Billable or 1:1 Min (if diff from Tx Min) Procedure, Rationale, Specifics   35  83155 Neuromuscular Re-Education (timed):  improve balance, coordination, kinesthetic sense, posture, core stability and proprioception to improve patient's ability to develop conscious control of individual muscles and awareness of position of extremities in order to progress to PLOF and address remaining functional goals. (see flow sheet as applicable)     Details if applicable:     10  02908 Manual Therapy (timed):  decrease pain, increase ROM, increase tissue extensibility, and increase postural awareness to improve patient's ability to progress to PLOF and address remaining functional goals.  The manual therapy interventions were performed at a separate and distinct time from the therapeutic activities interventions . (see flow sheet as